# Patient Record
Sex: FEMALE | Race: WHITE | Employment: OTHER | URBAN - METROPOLITAN AREA
[De-identification: names, ages, dates, MRNs, and addresses within clinical notes are randomized per-mention and may not be internally consistent; named-entity substitution may affect disease eponyms.]

---

## 2017-05-08 ENCOUNTER — HOSPITAL ENCOUNTER (OUTPATIENT)
Dept: MAMMOGRAPHY | Age: 67
Discharge: HOME | End: 2017-05-08
Attending: INTERNAL MEDICINE | Admitting: INTERNAL MEDICINE

## 2017-05-08 DIAGNOSIS — Z12.31 ENCOUNTER FOR SCREENING MAMMOGRAM FOR MALIGNANT NEOPLASM OF BREAST: ICD-10-CM

## 2018-05-24 ENCOUNTER — OFFICE VISIT (OUTPATIENT)
Dept: ORTHOPEDIC SURGERY | Age: 68
End: 2018-05-24

## 2018-05-24 VITALS
BODY MASS INDEX: 26.66 KG/M2 | SYSTOLIC BLOOD PRESSURE: 139 MMHG | DIASTOLIC BLOOD PRESSURE: 87 MMHG | HEART RATE: 72 BPM | HEIGHT: 69 IN | WEIGHT: 180 LBS

## 2018-05-24 DIAGNOSIS — M25.561 RIGHT KNEE PAIN, UNSPECIFIED CHRONICITY: Primary | ICD-10-CM

## 2018-05-24 DIAGNOSIS — M17.10 PATELLOFEMORAL ARTHRITIS: ICD-10-CM

## 2018-05-24 PROCEDURE — 3017F COLORECTAL CA SCREEN DOC REV: CPT | Performed by: PHYSICIAN ASSISTANT

## 2018-05-24 PROCEDURE — G8419 CALC BMI OUT NRM PARAM NOF/U: HCPCS | Performed by: PHYSICIAN ASSISTANT

## 2018-05-24 PROCEDURE — 4040F PNEUMOC VAC/ADMIN/RCVD: CPT | Performed by: PHYSICIAN ASSISTANT

## 2018-05-24 PROCEDURE — 99213 OFFICE O/P EST LOW 20 MIN: CPT | Performed by: PHYSICIAN ASSISTANT

## 2018-05-24 PROCEDURE — G8427 DOCREV CUR MEDS BY ELIG CLIN: HCPCS | Performed by: PHYSICIAN ASSISTANT

## 2018-05-24 PROCEDURE — 1090F PRES/ABSN URINE INCON ASSESS: CPT | Performed by: PHYSICIAN ASSISTANT

## 2018-05-24 PROCEDURE — 1036F TOBACCO NON-USER: CPT | Performed by: PHYSICIAN ASSISTANT

## 2018-05-24 PROCEDURE — 1123F ACP DISCUSS/DSCN MKR DOCD: CPT | Performed by: PHYSICIAN ASSISTANT

## 2018-05-24 PROCEDURE — G8400 PT W/DXA NO RESULTS DOC: HCPCS | Performed by: PHYSICIAN ASSISTANT

## 2018-05-24 RX ORDER — POTASSIUM CHLORIDE 20 MEQ/1
TABLET, EXTENDED RELEASE ORAL
COMMUNITY
Start: 2018-04-30

## 2018-05-24 RX ORDER — ATENOLOL AND CHLORTHALIDONE TABLET 50; 25 MG/1; MG/1
TABLET ORAL
COMMUNITY
Start: 2018-04-12

## 2018-05-24 RX ORDER — BUPROPION HYDROCHLORIDE 300 MG/1
TABLET ORAL
COMMUNITY
Start: 2018-05-01

## 2018-06-18 ENCOUNTER — OFFICE VISIT (OUTPATIENT)
Dept: ORTHOPEDIC SURGERY | Age: 68
End: 2018-06-18

## 2018-06-18 VITALS — HEIGHT: 69 IN | BODY MASS INDEX: 26.66 KG/M2 | WEIGHT: 180 LBS

## 2018-06-18 DIAGNOSIS — M17.11 PRIMARY OSTEOARTHRITIS OF RIGHT KNEE: Primary | ICD-10-CM

## 2018-06-18 PROCEDURE — 1123F ACP DISCUSS/DSCN MKR DOCD: CPT | Performed by: ORTHOPAEDIC SURGERY

## 2018-06-18 PROCEDURE — 1090F PRES/ABSN URINE INCON ASSESS: CPT | Performed by: ORTHOPAEDIC SURGERY

## 2018-06-18 PROCEDURE — G8400 PT W/DXA NO RESULTS DOC: HCPCS | Performed by: ORTHOPAEDIC SURGERY

## 2018-06-18 PROCEDURE — G8427 DOCREV CUR MEDS BY ELIG CLIN: HCPCS | Performed by: ORTHOPAEDIC SURGERY

## 2018-06-18 PROCEDURE — 3017F COLORECTAL CA SCREEN DOC REV: CPT | Performed by: ORTHOPAEDIC SURGERY

## 2018-06-18 PROCEDURE — 4040F PNEUMOC VAC/ADMIN/RCVD: CPT | Performed by: ORTHOPAEDIC SURGERY

## 2018-06-18 PROCEDURE — G8419 CALC BMI OUT NRM PARAM NOF/U: HCPCS | Performed by: ORTHOPAEDIC SURGERY

## 2018-06-18 PROCEDURE — 1036F TOBACCO NON-USER: CPT | Performed by: ORTHOPAEDIC SURGERY

## 2018-06-18 PROCEDURE — 99213 OFFICE O/P EST LOW 20 MIN: CPT | Performed by: ORTHOPAEDIC SURGERY

## 2018-07-16 ENCOUNTER — HOSPITAL ENCOUNTER (OUTPATIENT)
Dept: PHYSICAL THERAPY | Age: 68
Setting detail: THERAPIES SERIES
Discharge: HOME | End: 2018-07-16
Payer: MEDICARE

## 2018-07-16 PROCEDURE — 97112 NEUROMUSCULAR REEDUCATION: CPT

## 2018-07-16 PROCEDURE — G8979 MOBILITY GOAL STATUS: HCPCS

## 2018-07-16 PROCEDURE — G8978 MOBILITY CURRENT STATUS: HCPCS

## 2018-07-16 PROCEDURE — 97162 PT EVAL MOD COMPLEX 30 MIN: CPT

## 2018-07-16 ASSESSMENT — PAIN DESCRIPTION - LOCATION: LOCATION: ANKLE

## 2018-07-16 ASSESSMENT — PAIN DESCRIPTION - PAIN TYPE: TYPE: ACUTE PAIN

## 2018-07-16 ASSESSMENT — PAIN SCALES - GENERAL: PAINLEVEL_OUTOF10: 0

## 2018-07-16 ASSESSMENT — PAIN DESCRIPTION - ORIENTATION: ORIENTATION: LOWER;RIGHT

## 2018-07-16 ASSESSMENT — PAIN DESCRIPTION - FREQUENCY: FREQUENCY: INTERMITTENT

## 2018-07-16 ASSESSMENT — PAIN DESCRIPTION - PROGRESSION: CLINICAL_PROGRESSION: RAPIDLY IMPROVING

## 2018-07-16 ASSESSMENT — PAIN DESCRIPTION - DESCRIPTORS: DESCRIPTORS: ACHING;SHARP;SORE

## 2018-07-16 NOTE — FLOWSHEET NOTE
Physical Therapy Daily Treatment Note    Date:  2018    Patient Name:  Swathi Schultz    :  1950  MRN: [id number]  Restrictions/Precautions:    Medical/Treatment Diagnosis Information:  · Diagnosis: R knee pain   Insurance/Certification information:  PT Insurance Information: Medicare and Beena Bernstein  Physician Information:  Referring Practitioner: Blake Solis MD  Plan of care signed (Y/N):  N  Visit# / total visits:       G-Code (if applicable):         PT G-Codes  Functional Assessment Tool Used: LEFS  Score: 44/80  Functional Limitation: Mobility: Walking and moving around  Mobility: Walking and Moving Around Current Status (): At least 40 percent but less than 60 percent impaired, limited or restricted  Mobility: Walking and Moving Around Goal Status ():  At least 20 percent but less than 40 percent impaired, limited or restricted    Medicare Cap (if applicable):  530 = total amount used, updated 2018    Time in:   8:30      Timed Treatment: 15 Total Treatment Time:  60  ________________________________________________________________________________________    Pain Level:    /10  SUBJECTIVE:  See initial eval    OBJECTIVE:     Exercise/Equipment Resistance/Repetitions Other comments          TA set with BP cuff   NV    bridge NV    Prone hip IR/ER NV    clamshell NV    Hip stacking NV    Arch lifts NV    Standing hip ER into wall NV                                                                                      Other Therapeutic Activities:  Education regarding POC including demonstration with models of anatomy and physiology in order to maximize benefits of treatment; total neuro re-ed 15 minutes    Manual Treatments:     Correction of pelvic inominate, if indicated    Modalities:      Test/Measurements:  See initial eval       ASSESSMENT:   See initial eval      Treatment/Activity Tolerance:   [x] Patient tolerated treatment well [] Patient limited by ellie  [] Patient

## 2018-07-16 NOTE — PROGRESS NOTES
Physical Therapy  Initial Assessment  Date: 2018  Patient Name: Amalia Robertson  MRN: [id number]  : 1950          Restrictions   none per patient    Subjective   General  Chart Reviewed: Yes  Family / Caregiver Present: No  Referring Practitioner: Giovani Orr MD  Referral Date : 18  Diagnosis: R knee pain   Follows Commands: Within Functional Limits  PT Visit Information  Onset Date: 18  PT Insurance Information: Medicare and [employer]  Subjective  Subjective: Pt reports that she had a badly sprained ankle many years ago on her R LE which has caused her to have multiple falls over the past several years. She has worn orthotics for years, but when she tried to get new, more supportive ones, they bothered her knees too much that she sent them back. Has noticed that her R foot caves in more than L. In early April, pt fell landing directly on her R knee. Was in high amounts of pain directly over R knee, which also started affecting both hips and both shoulders. Difficulty turning over in bed, walking, going up and down stairs. Had difficulty at work. Saw MD and received a cortisone injection 18 with excellent results. Sharp knee pain resolved within a couple days as well as hip and shoulder pain. Continues to have pain along R LE, has scoliosis of lumbar spine, ankle pain with walking, and back/hip pain with walking. Able to walk 2 miles in the morning, but it causes her to have pain the rest of the day. Pt concerned that she will fall again and it will cause this to start back up. Pain Screening  Patient Currently in Pain: Yes  Pain Assessment  Pain Assessment: 0-10  Pain Level: 0 (back pain 2/10 lingers constantly )  Pain Type: Acute pain  Pain Location: Ankle  Pain Orientation: Lower;Right  Pain Descriptors: Aching; [place]; Sore  Pain Frequency: Intermittent  Clinical Progression: Rapidly improving  Vital Signs  Patient Currently in Pain: Yes    Social/Functional History  Social/Functional patient has been evaluated for physical therapy services and for therapy to continue, Medicare requires monthly physician review of the treatment plan. Please review the attached evaluation and/or summary of the patient's plan of care, and verify that you agree therapy should continue by signing the attached document and sending it back to our office. Plan of Care/Treatment to date:  [x] Therapeutic Exercise   [x] Modalities:  [x] Therapeutic Activity      [] Ultrasound  [] Electrical Stimulation   [x] Gait Training      [] Cervical Traction [] Lumbar Traction  [x] Neuromuscular Re-education  [] Hot/Coldpack [] Iontophoresis    [x] Instruction in HEP      Other:  [x] Manual Therapy       []                        [] Aquatic Therapy       []                      ? Frequency/Duration:  # Days per week: [] 1 day # Weeks: [] 1 week [] 5 weeks      [x] 2 days? [] 2 weeks [] 6 weeks     [] 3 days   [] 3 weeks [] 7 weeks     [] 4 days   [x] 4 weeks [] 8 weeks    Rehab Potential: [] Excellent [x] Good [] Fair  [] Poor       Electronically signed by:  Eunice Gonzalez, PT , DPT [id number]      If you have any questions or concerns, please don't hesitate to call.   Thank you for your referral.    Physician Signature:________________________________Date:__________________  By signing above, therapists plan is approved by physician

## 2018-07-27 ENCOUNTER — HOSPITAL ENCOUNTER (OUTPATIENT)
Dept: PHYSICAL THERAPY | Age: 68
Setting detail: THERAPIES SERIES
Discharge: HOME | End: 2018-07-27
Payer: MEDICARE

## 2018-07-27 PROCEDURE — 97140 MANUAL THERAPY 1/> REGIONS: CPT

## 2018-07-27 PROCEDURE — 97110 THERAPEUTIC EXERCISES: CPT

## 2018-07-30 ENCOUNTER — HOSPITAL ENCOUNTER (OUTPATIENT)
Dept: PHYSICAL THERAPY | Age: 68
Setting detail: THERAPIES SERIES
Discharge: HOME | End: 2018-07-30
Payer: MEDICARE

## 2018-07-30 PROCEDURE — 97110 THERAPEUTIC EXERCISES: CPT

## 2018-07-30 PROCEDURE — 97140 MANUAL THERAPY 1/> REGIONS: CPT

## 2018-07-30 NOTE — FLOWSHEET NOTE
break R QL/lumbar paraspinals  Scissors MET followed by shotgun technique without cavitation    Total manual 10 minutes     Modalities:      Test/Measurements:  See initial eval       ASSESSMENT:   Pt demonstrated mod difficulty with maintaining 50 mmHg during LE movement (max previously). Responding well to manual therapy. Progress as pt tolerates. Treatment/Activity Tolerance:   [x] Patient tolerated treatment well [] Patient limited by fatique  [] Patient limited by pain [] Patient limited by other medical complications  [] Other:     Goals:          Long term goals  Time Frame for Long term goals : 4 weeks  Long term goal 1: Pt will be independent with HEP  Long term goal 2: Pt will increase R LE strength to grossly 4/5 or better  Long term goal 3: Pt will report walking in the morning without limitation of pain  Long term goal 4: Pt will demonstrate improved single leg balance R LE to 10 seconds or greater with neutral foot, knee, hip  Long term goal 5: Pt will demonstrate proper propulsion and hip extension while ambulating within clinic     Plan: [x] Continue per plan of care [] Alter current plan (see comments)   [] Plan of care initiated [] Hold pending MD visit [] Discharge      Plan for Next Session:  Correction of biomechanical dysfunctions as they present on visit. Restore proper motor firing pattern and functional muscle activation as presented on visit. Progress as tolerates.     Re-Certification Due Date:         Signature:  Duncan Rodriguez, PT

## 2018-08-03 ENCOUNTER — HOSPITAL ENCOUNTER (OUTPATIENT)
Dept: PHYSICAL THERAPY | Age: 68
Setting detail: THERAPIES SERIES
Discharge: HOME | End: 2018-08-03
Payer: MEDICARE

## 2018-08-03 PROCEDURE — 97110 THERAPEUTIC EXERCISES: CPT

## 2018-08-03 PROCEDURE — 97140 MANUAL THERAPY 1/> REGIONS: CPT

## 2018-08-03 NOTE — FLOWSHEET NOTE
awais correction  Side-lying gr IV mobilization to L4-5 opening R  Breaking break R QL/lumbar paraspinals  Scissors MET followed by shotgun technique without cavitation    Total manual 10 minutes     Modalities:      Test/Measurements:  See initial eval       ASSESSMENT:   Pt demonstrated min difficulty with maintaining 50 mmHg during LE movement (mod previously). Responding well to manual therapy. Pt had good posture during new hip stacking activity, limited by her R ankle stabilization. Verbal cues required to maintain neutral hip positioning. Progress as pt tolerates. Treatment/Activity Tolerance:   [x] Patient tolerated treatment well [] Patient limited by fatique  [] Patient limited by pain [] Patient limited by other medical complications  [] Other:     Goals:          Long term goals  Time Frame for Long term goals : 4 weeks  Long term goal 1: Pt will be independent with HEP  Long term goal 2: Pt will increase R LE strength to grossly 4/5 or better  Long term goal 3: Pt will report walking in the morning without limitation of pain  Long term goal 4: Pt will demonstrate improved single leg balance R LE to 10 seconds or greater with neutral foot, knee, hip  Long term goal 5: Pt will demonstrate proper propulsion and hip extension while ambulating within clinic     Plan: [x] Continue per plan of care [] Alter current plan (see comments)   [] Plan of care initiated [] Hold pending MD visit [] Discharge      Plan for Next Session:  Correction of biomechanical dysfunctions as they present on visit. Restore proper motor firing pattern and functional muscle activation as presented on visit. Progress as tolerates.     Re-Certification Due Date:         Signature:  Primo Gonzalez, PT

## 2018-08-13 ENCOUNTER — APPOINTMENT (OUTPATIENT)
Dept: PHYSICAL THERAPY | Age: 68
End: 2018-08-13
Payer: MEDICARE

## 2018-08-17 ENCOUNTER — APPOINTMENT (OUTPATIENT)
Dept: PHYSICAL THERAPY | Age: 68
End: 2018-08-17
Payer: MEDICARE

## 2018-08-20 ENCOUNTER — HOSPITAL ENCOUNTER (OUTPATIENT)
Dept: PHYSICAL THERAPY | Age: 68
Setting detail: THERAPIES SERIES
Discharge: HOME | End: 2018-08-20
Payer: MEDICARE

## 2018-08-20 PROCEDURE — 97140 MANUAL THERAPY 1/> REGIONS: CPT

## 2018-08-20 PROCEDURE — 97110 THERAPEUTIC EXERCISES: CPT

## 2018-08-24 ENCOUNTER — HOSPITAL ENCOUNTER (OUTPATIENT)
Dept: PHYSICAL THERAPY | Age: 68
Setting detail: THERAPIES SERIES
Discharge: HOME | End: 2018-08-24
Payer: MEDICARE

## 2018-08-24 PROCEDURE — 97110 THERAPEUTIC EXERCISES: CPT

## 2018-08-24 PROCEDURE — 97140 MANUAL THERAPY 1/> REGIONS: CPT

## 2018-08-24 NOTE — FLOWSHEET NOTE
Physical Therapy Daily Treatment Note    Date:  2018    Patient Name:  Nahomi Morales    :  1950  MRN: [id number]  Restrictions/Precautions:    Medical/Treatment Diagnosis Information:  · Diagnosis: R knee pain   Insurance/Certification information:  PT Insurance Information: Medicare and Keri0 Sandip Tang  Physician Information:  Referring Practitioner: Juan Rosales MD  Plan of care signed (Y/N):  N  Visit# / total visits:        G-Code (if applicable):         PT G-Codes  Functional Assessment Tool Used: LEFS  Score: 44/80  Functional Limitation: Mobility: Walking and moving around  Mobility: Walking and Moving Around Current Status (): At least 40 percent but less than 60 percent impaired, limited or restricted  Mobility: Walking and Moving Around Goal Status (): At least 20 percent but less than 40 percent impaired, limited or restricted    Medicare Cap (if applicable):  608 = total amount used, updated 2018    Time in:   3:05      Timed Treatment: 30 Total Treatment Time:  30  ________________________________________________________________________________________    Pain Level:    4-5/10 R lower back  SUBJECTIVE:  Pt reports that her back pain is not as frequent, but when it comes, the intensity is the same. She is able to help herself a lot with remembering her abdominals while standing at work, but she doesn't have the strength to keep them on.       OBJECTIVE:     Exercise/Equipment Resistance/Repetitions Other comments          TA set with BP cuff     -  Until control demonstrated   HEP   bridge 10x HEP   LTR 10x B HEP   Prone hip IR/ER with resistance x2 minutes    clamshell 15x5\" B HEP   Hip stacking 2x30 B         Standing hip ER into wall NV                                                 TG                                    Other Therapeutic Activities:  Education regarding POC including demonstration with models of anatomy and physiology in order to maximize

## 2018-08-27 ENCOUNTER — APPOINTMENT (OUTPATIENT)
Dept: PHYSICAL THERAPY | Age: 68
End: 2018-08-27
Payer: MEDICARE

## 2018-08-27 ENCOUNTER — HOSPITAL ENCOUNTER (OUTPATIENT)
Dept: PHYSICAL THERAPY | Age: 68
Setting detail: THERAPIES SERIES
Discharge: HOME | End: 2018-08-27
Payer: MEDICARE

## 2018-08-27 PROCEDURE — G8981 BODY POS CURRENT STATUS: HCPCS

## 2018-08-27 PROCEDURE — G8979 MOBILITY GOAL STATUS: HCPCS

## 2018-08-27 PROCEDURE — 97161 PT EVAL LOW COMPLEX 20 MIN: CPT

## 2018-08-27 PROCEDURE — 95992 CANALITH REPOSITIONING PROC: CPT

## 2018-08-27 PROCEDURE — 97112 NEUROMUSCULAR REEDUCATION: CPT

## 2018-08-27 NOTE — PROGRESS NOTES
Outpatient Physical Therapy  Phone: [phone] Fax: [phone]     To: Akanksha Elizondo MD    From: Jose Waters PT     Patient: Anat Alexander      : 1950  Diagnosis:  Vertigo, neck pain   Date: 2018  Treatment Diagnosis:      Physical Therapy Certification/Re-Certification Form  Dear Dr. Yordan Okeefe,   The following patient has been evaluated for physical therapy services and for therapy to continue, Medicare requires monthly physician review of the treatment plan. Please review the attached evaluation and/or summary of the patient's plan of care, and verify that you agree therapy should continue by signing the attached document and sending it back to our office. Plan of Care/Treatment to date:  [] Therapeutic Exercise   [] Modalities:  [] Therapeutic Activity     [] Ultrasound  [] Electrical Stimulation   [] Gait Training      [] Cervical Traction [] Lumbar Traction  [x] Neuromuscular Re-education  [] Hot/Coldpack [] Iontophoresis    [x] Instruction in HEP      Other:  [x] Manual Therapy       [x]   Vestibular rehab                     [] Aquatic Therapy       []                      ? Frequency/Duration:  # Days per week: [x] 1 day # Weeks: [] 1 week [] 5 weeks      [x] 2 days? [] 2 weeks [] 6 weeks     [] 3 days   [] 3 weeks [] 7 weeks     [] 4 days   [x] 4 weeks [] 8 weeks    Rehab Potential: [] Excellent [x] Good [] Fair  [] Poor       Electronically signed by:  Jose Waters PT , DPT [id number]      If you have any questions or concerns, please don't hesitate to call.   Thank you for your referral.    Physician Signature:________________________________Date:__________________  By signing above, therapists plan is approved by physician
Assessment Tool Used: DHI  Score: 26  Functional Limitation: Changing and maintaining body position  Mobility: Walking and Moving Around Goal Status (): At least 20 percent but less than 40 percent impaired, limited or restricted  Changing and Maintaining Body Position Current Status (): At least 1 percent but less than 20 percent impaired, limited or restricted    Treatment Plan:    Today's Treatment:    [x] See flowsheet   [x] Patient treated with canalith repositioning maneuver   [x] Education materials provided on BPPV/Vestibular Dysfunction   [x] Precautions provided and patient to follow precautions for next 24 hours in regards to BPPV management   [] Written home exercise instructions   [] Other:    Plan: 1-2 x/week for 4 weeks   [x] Home Exercise Program  [x] Canalith Repositioning Maneuvers   [] Gaze Stabilization Exercises [] Habituation Exercises   [x] Neuromuscular Re-Education [x] Clinic-based Vestibular/Balance Therapy   [x] Patient Education   [] Other:   [] Patient agrees with Plan of Care    Signature:  Sierra Valverde, PT, DPT [id number]

## 2018-08-31 ENCOUNTER — HOSPITAL ENCOUNTER (OUTPATIENT)
Dept: PHYSICAL THERAPY | Age: 68
Setting detail: THERAPIES SERIES
Discharge: HOME | End: 2018-08-31
Payer: MEDICARE

## 2018-08-31 PROCEDURE — 97110 THERAPEUTIC EXERCISES: CPT

## 2018-08-31 PROCEDURE — 97140 MANUAL THERAPY 1/> REGIONS: CPT

## 2018-08-31 NOTE — FLOWSHEET NOTE
Physical Therapy Daily Treatment Note    Date:  2018    Patient Name:  Norma Mera    :  1950  MRN: [id number]  Restrictions/Precautions:    Medical/Treatment Diagnosis Information:  · Diagnosis: vertigo, neck pain  Insurance/Certification information:  PT Insurance Information: Medicare and Keri0 Sandip Tang  Physician Information:  Referring Practitioner: Miles Saul MD  Plan of care signed (Y/N):  N  Visit# / total visits:  -     G-Code (if applicable):         PT G-Codes  Functional Assessment Tool Used: DHI  Score: 0 (26 at eval)  Functional Limitation: Changing and maintaining body position  Mobility: Walking and Moving Around Goal Status (): At least 20 percent but less than 40 percent impaired, limited or restricted  Changing and Maintaining Body Position Current Status (): At least 1 percent but less than 20 percent impaired, limited or restricted    Medicare Cap (if applicable):  181 = total amount used, updated 2018    Time in:   3:00      Timed Treatment: 5 Total Treatment Time:  5  ________________________________________________________________________________________    Pain Level:    /10  SUBJECTIVE:  Pt reports that her dizziness is gone.      OBJECTIVE:     Exercise/Equipment Resistance/Repetitions Other comments          Canalith repositioning                                                                                                                                 Other Therapeutic Activities:     Manual Treatments:         Modalities:      Test/Measurements:       Positional Testing  R Hallpike-Charanjit maneuver:               Nystagmus:     [] Yes                 [x] No                   [] Duration:                                              [] Direction:                      Vertigo:            [] Yes                 [x] No                   [] Duration:   L Hallpike-Charanjit maneuver:              Nystagmus:     [] Yes                 [x] No [] Duration:                                          [] Direction:                      Vertigo:            [] Yes                 [x] No                   [] Duration:   Supine roll head right:              Nystagmus:     [] Yes                 [x] No                   [] Duration:                                              [] Direction:                      Vertigo:            [] Yes                 [x] No                   [] Duration:   Supine roll head left:              Nystagmus:     [] Yes                 [x] No                   [] Duration:                                              [] Direction:                      Vertigo:            [] Yes                 [x] No                   [] Duration:         ASSESSMENT:     See initial eval    Treatment/Activity Tolerance:   [x] Patient tolerated treatment well [] Patient limited by fatique  [] Patient limited by pain [] Patient limited by other medical complications  [] Other:     Goals:          Long term goals  Time Frame for Long term goals : 4 weeks  Long term goal 1: Pt will be independent with HEP 0- met  Long term goal 2: Pt will transfer sit<>supine and rolling without dizziness - met  Long term goal 3: Pt will look up/down without dizziness - met  Long term goal 4: Pt will improve score on DHI to 16 or lower to indicate significant change - met    Plan: [] Continue per plan of care [] Alter current plan (see comments)   [] Plan of care initiated [] Hold pending MD visit [x] Discharge      Plan for Next Session:      Re-Certification Due Date:         Signature:  Esteban Carson PT

## 2018-09-03 ENCOUNTER — APPOINTMENT (OUTPATIENT)
Dept: PHYSICAL THERAPY | Age: 68
End: 2018-09-03
Payer: MEDICARE

## 2018-09-07 ENCOUNTER — HOSPITAL ENCOUNTER (OUTPATIENT)
Dept: PHYSICAL THERAPY | Age: 68
Setting detail: THERAPIES SERIES
Discharge: HOME | End: 2018-09-07
Payer: MEDICARE

## 2018-09-07 PROCEDURE — 97164 PT RE-EVAL EST PLAN CARE: CPT

## 2018-09-07 PROCEDURE — 97112 NEUROMUSCULAR REEDUCATION: CPT

## 2018-09-07 PROCEDURE — G8978 MOBILITY CURRENT STATUS: HCPCS

## 2018-09-07 PROCEDURE — G8979 MOBILITY GOAL STATUS: HCPCS

## 2018-09-07 NOTE — FLOWSHEET NOTE
order to maximize benefits of treatment     Manual Treatments:         Modalities:      Test/Measurements:      Observation/Palpation  Posture: Fair  Palpation: no significant TTP   Observation: Pt stands with increased pes planus R compared to L, B genuvarus (mild), but increased hip IR/ADD bringing knee inward during stance; During ambulation, improved propulsion B (At eval: pt demonstances absent arch on R, poor propulsion R with pelvic rotation to avoid full hip extension on R LE)  Body Mechanics:  Double leg squat - improved (At eval: double leg squat favors R LE)  SLS R LE maintains neutral at hip, knee, ankle, improved balance  (At eval: demonstrated R pes planus, lateral hip lean, knee valgus moment and increased imbalance)  SLS L LE maintains neutral at hip, knee, ankle, improved balance      PROM RLE (degrees)  RLE PROM: WNL  PROM LLE (degrees)  LLE PROM: WNL  Spine  Lumbar: WBL without pain   Special Tests: (-) flick test B;      Strength RLE  Strength RLE: Exception  R Hip Flexion: 4/5 (4-/5 at eval)  R Hip Extension: 4/5 (4-/5 at eval)  R Hip ABduction: 4/5  R Hip Internal Rotation: 4/5 (4-/5 at eval)  R Hip External Rotation: 4/5 (4-/5 at eval)  R Knee Flexion: 4/5  R Knee Extension: 4+/5  R Ankle Dorsiflexion: 4+/5 (4-/5 at eval)  R Ankle Plantar flexion: 4/5  R Ankle Inversion: 4+/5 (4-/5 at eval)  R Ankle Eversion: 4+/5 (4-/5 at eval)    Strength LLE  Strength LLE: Exception  L Hip Flexion: 4/5  L Hip Extension: 4/5  L Hip ABduction: 4/5  L Hip Internal Rotation: 4/5  L Hip External Rotation: 4/5  L Knee Flexion: 4+/5  L Knee Extension: 4+/5  L Ankle Dorsiflexion: 5/5  L Ankle Plantar Flexion: 5/5  L Ankle Inversion: 5/5  L Ankle Eversion: 5/5    Balance  Sitting - Static: Good  Sitting - Dynamic: Good  Standing - Static: Fair;+  Standing - Dynamic: Fair;-  Single Leg Stance R Le (0 at eval)   Single Leg Stance L Le+ (10 at eval)         ASSESSMENT:   After 8 PT visits, pt is demonstrating significant improvements in LE strength, balance, and overall mechanics of R LE. She continues to have difficulty with higher level balance as well as functional movements with proper mechanics. Recommend continued PT at 1x/week for 4 weeks to work toward new goal.     Treatment/Activity Tolerance:   [x] Patient tolerated treatment well [] Patient limited by fatique  [] Patient limited by pain [] Patient limited by other medical complications  [] Other:     Goals:          Long term goals  Time Frame for Long term goals : 4 weeks  Long term goal 1: Pt will be independent with HEP - met  Long term goal 2: Pt will increase R LE strength to grossly 4/5 or better - met, see above  Long term goal 3: Pt will report walking in the morning without limitation of pain - met  Long term goal 4: Pt will demonstrate improved single leg balance R LE to 10 seconds or greater with neutral foot, knee, hip - met, see above   Long term goal 5: Pt will demonstrate proper propulsion and hip extension while ambulating within clinic - met    New goals  Long term goal 1: Pt will demonstrate proper ankle reaction to balance perturbations   Long term goal 2: Pt will demonstrate 30 seconds eyes closed on compliant surface without LOB  Long term goal 3: Pt will demonstrate proper bending/lifting mechanics     Plan: [] Continue per plan of care [x] Alter current plan (see comments)   [] Plan of care initiated [] Hold pending MD visit [] Discharge      Plan for Next Session:  Correction of biomechanical dysfunctions as they present on visit. Restore proper motor firing pattern and functional muscle activation as presented on visit. Progress as tolerates.     Re-Certification Due Date:         Signature:  Tiara Holloway PT          Outpatient Physical Therapy  Phone: [phone] Fax: [phone]     To: Liyah Arias MD     From: Tiara Holloway PT     Patient: Priyank Shay       : 1950  Diagnosis:  Knee pain     Date:

## 2018-09-10 ENCOUNTER — APPOINTMENT (OUTPATIENT)
Dept: PHYSICAL THERAPY | Age: 68
End: 2018-09-10
Payer: MEDICARE

## 2018-09-14 ENCOUNTER — HOSPITAL ENCOUNTER (OUTPATIENT)
Dept: PHYSICAL THERAPY | Age: 68
Setting detail: THERAPIES SERIES
Discharge: HOME | End: 2018-09-14
Payer: MEDICARE

## 2018-09-14 PROCEDURE — 97110 THERAPEUTIC EXERCISES: CPT

## 2018-09-14 NOTE — FLOWSHEET NOTE
Physical Therapy Daily Treatment Note    Date:  2018    Patient Name:  Tiesha Alvarez    :  1950  MRN: [id number]  Restrictions/Precautions:    Medical/Treatment Diagnosis Information:  · Diagnosis: R knee pain   Insurance/Certification information:  PT Insurance Information: Medicare and [employer] Sandip Tang  Physician Information:  Referring Practitioner: Lenora Milian MD  Plan of care signed (Y/N):  N  Visit# / total visits:        G-Code (if applicable):         PT G-Codes  Functional Assessment Tool Used: LEFS  Score: 52/80 (44/80 at eval)  Functional Limitation: Mobility: Walking and moving around  Mobility: Walking and Moving Around Current Status (): At least 40 percent but less than 60 percent impaired, limited or restricted  Mobility: Walking and Moving Around Goal Status (): At least 20 percent but less than 40 percent impaired, limited or restricted    Medicare Cap (if applicable):  402 = total amount used, updated 2018    Time in:   3:00      Timed Treatment: 30 Total Treatment Time:  30  ________________________________________________________________________________________    Pain Level:   0-1/10 R lower back  SUBJECTIVE:  Pt reports that she had a long day at work and had some back pain, but made it through the day well. Pain has resolved since being off work for a couple hours.      OBJECTIVE:     Exercise/Equipment Resistance/Repetitions Other comments          TA set with BP cuff     -    HEP   bridge HEP   LTR HEP   Prone hip IR/ER with resistance    clamshell HEP   Hip stacking with LE on SB 2x30 B         Standing hip ER into wall NV    rockerboard  1' balance, 1' rocking each drection    Romberg on airex 2x30\" EO/EC    Dowel angie neuro re-ed x5 minutes  Seated flexion  Sit<>stand    Tandem stance on airex X60\" B    Double limb stance on BOSU X60\"  With minisquat 10x           TG  Level 3 x 5 minutes                                  Other Therapeutic Activities:  Education regarding POC including demonstration with models of anatomy and physiology in order to maximize benefits of treatment     Manual Treatments:         Modalities:      Test/Measurements:      Observation/Palpation  Posture: Fair  Palpation: no significant TTP   Observation: Pt stands with increased pes planus R compared to L, B genuvarus (mild), but increased hip IR/ADD bringing knee inward during stance; During ambulation, improved propulsion B (At eval: pt demonstances absent arch on R, poor propulsion R with pelvic rotation to avoid full hip extension on R LE)  Body Mechanics:  Double leg squat - improved (At eval: double leg squat favors R LE)  SLS R LE maintains neutral at hip, knee, ankle, improved balance  (At eval: demonstrated R pes planus, lateral hip lean, knee valgus moment and increased imbalance)  SLS L LE maintains neutral at hip, knee, ankle, improved balance      PROM RLE (degrees)  RLE PROM: WNL  PROM LLE (degrees)  LLE PROM: WNL  Spine  Lumbar: WBL without pain   Special Tests: (-) flick test B;      Strength RLE  Strength RLE: Exception  R Hip Flexion: 4/5 (4-/5 at eval)  R Hip Extension: 4/5 (4-/5 at eval)  R Hip ABduction: 4/5  R Hip Internal Rotation: 4/5 (4-/5 at eval)  R Hip External Rotation: 4/5 (4-/5 at eval)  R Knee Flexion: 4/5  R Knee Extension: 4+/5  R Ankle Dorsiflexion: 4+/5 (4-/5 at eval)  R Ankle Plantar flexion: 4/5  R Ankle Inversion: 4+/5 (4-/5 at eval)  R Ankle Eversion: 4+/5 (4-/5 at eval)    Strength LLE  Strength LLE: Exception  L Hip Flexion: 4/5  L Hip Extension: 4/5  L Hip ABduction: 4/5  L Hip Internal Rotation: 4/5  L Hip External Rotation: 4/5  L Knee Flexion: 4+/5  L Knee Extension: 4+/5  L Ankle Dorsiflexion: 5/5  L Ankle Plantar Flexion: 5/5  L Ankle Inversion: 5/5  L Ankle Eversion: 5/5    Balance  Sitting - Static: Good  Sitting - Dynamic: Good  Standing - Static: Fair;+  Standing - Dynamic: Fair;-  Single Leg Stance R Le (0 at eval)

## 2018-09-17 ENCOUNTER — APPOINTMENT (OUTPATIENT)
Dept: PHYSICAL THERAPY | Age: 68
End: 2018-09-17
Payer: MEDICARE

## 2018-09-21 ENCOUNTER — HOSPITAL ENCOUNTER (OUTPATIENT)
Dept: PHYSICAL THERAPY | Age: 68
Setting detail: THERAPIES SERIES
Discharge: HOME | End: 2018-09-21
Payer: MEDICARE

## 2018-09-21 PROCEDURE — 97110 THERAPEUTIC EXERCISES: CPT

## 2018-09-21 NOTE — FLOWSHEET NOTE
Education regarding POC including demonstration with models of anatomy and physiology in order to maximize benefits of treatment     Manual Treatments:         Modalities:      Test/Measurements:      Observation/Palpation  Posture: Fair  Palpation: no significant TTP   Observation: Pt stands with increased pes planus R compared to L, B genuvarus (mild), but increased hip IR/ADD bringing knee inward during stance; During ambulation, improved propulsion B (At eval: pt demonstances absent arch on R, poor propulsion R with pelvic rotation to avoid full hip extension on R LE)  Body Mechanics:  Double leg squat - improved (At eval: double leg squat favors R LE)  SLS R LE maintains neutral at hip, knee, ankle, improved balance  (At eval: demonstrated R pes planus, lateral hip lean, knee valgus moment and increased imbalance)  SLS L LE maintains neutral at hip, knee, ankle, improved balance      PROM RLE (degrees)  RLE PROM: WNL  PROM LLE (degrees)  LLE PROM: WNL  Spine  Lumbar: WBL without pain   Special Tests: (-) flick test B;      Strength RLE  Strength RLE: Exception  R Hip Flexion: 4/5 (4-/5 at eval)  R Hip Extension: 4/5 (4-/5 at eval)  R Hip ABduction: 4/5  R Hip Internal Rotation: 4/5 (4-/5 at eval)  R Hip External Rotation: 4/5 (4-/5 at eval)  R Knee Flexion: 4/5  R Knee Extension: 4+/5  R Ankle Dorsiflexion: 4+/5 (4-/5 at eval)  R Ankle Plantar flexion: 4/5  R Ankle Inversion: 4+/5 (4-/5 at eval)  R Ankle Eversion: 4+/5 (4-/5 at eval)    Strength LLE  Strength LLE: Exception  L Hip Flexion: 4/5  L Hip Extension: 4/5  L Hip ABduction: 4/5  L Hip Internal Rotation: 4/5  L Hip External Rotation: 4/5  L Knee Flexion: 4+/5  L Knee Extension: 4+/5  L Ankle Dorsiflexion: 5/5  L Ankle Plantar Flexion: 5/5  L Ankle Inversion: 5/5  L Ankle Eversion: 5/5    Balance  Sitting - Static: Good  Sitting - Dynamic: Good  Standing - Static: Fair;+  Standing - Dynamic: Fair;-  Single Leg Stance R Le (0 at eval)   Single Leg Stance L Le+ (10 at eval)         ASSESSMENT:  Pt demonstrated proper ankle and hip strategies and improved balance with lumbar stabilization cues. Increased knee pain with minisquats on BOSU, but improved at end of session. Progress balance and control as pt tolerates. Treatment/Activity Tolerance:   [x] Patient tolerated treatment well [] Patient limited by fatique  [] Patient limited by pain [] Patient limited by other medical complications  [] Other:     Goals:          Long term goals  Time Frame for Long term goals : 4 weeks  Long term goal 1: Pt will be independent with HEP - met  Long term goal 2: Pt will increase R LE strength to grossly 4/5 or better - met, see above  Long term goal 3: Pt will report walking in the morning without limitation of pain - met  Long term goal 4: Pt will demonstrate improved single leg balance R LE to 10 seconds or greater with neutral foot, knee, hip - met, see above   Long term goal 5: Pt will demonstrate proper propulsion and hip extension while ambulating within clinic - met    New goals  Long term goal 1: Pt will demonstrate proper ankle reaction to balance perturbations   Long term goal 2: Pt will demonstrate 30 seconds eyes closed on compliant surface without LOB  Long term goal 3: Pt will demonstrate proper bending/lifting mechanics     Plan: [x] Continue per plan of care [] Alter current plan (see comments)   [] Plan of care initiated [] Hold pending MD visit [] Discharge      Plan for Next Session:  Correction of biomechanical dysfunctions as they present on visit. Restore proper motor firing pattern and functional muscle activation as presented on visit. Progress as tolerates.     Re-Certification Due Date:

## 2018-09-24 ENCOUNTER — APPOINTMENT (OUTPATIENT)
Dept: PHYSICAL THERAPY | Age: 68
End: 2018-09-24
Payer: MEDICARE

## 2018-09-28 ENCOUNTER — HOSPITAL ENCOUNTER (OUTPATIENT)
Dept: PHYSICAL THERAPY | Age: 68
Setting detail: THERAPIES SERIES
Discharge: HOME | End: 2018-09-28
Payer: MEDICARE

## 2018-09-28 NOTE — PROGRESS NOTES
Physical Therapy  Cancellation/No-show Note  Patient Name:  Chiqui Gonzalez  :  1950   Date:  2018  Cancels to date: 1  No-shows to date: 0    For today's appointment patient:  [x] Cancelled  [] Rescheduled appointment  [] No-show     Reason given by patient:  [] Patient ill  [] Conflicting appointment  [] No transportation    [x] Conflict with work  [] No reason given  [] Other:     Comments:      Electronically signed by:  Hortensia Lazaro PT

## 2018-10-01 ENCOUNTER — APPOINTMENT (OUTPATIENT)
Dept: PHYSICAL THERAPY | Age: 68
End: 2018-10-01
Payer: MEDICARE

## 2018-10-05 ENCOUNTER — HOSPITAL ENCOUNTER (OUTPATIENT)
Dept: PHYSICAL THERAPY | Age: 68
Setting detail: THERAPIES SERIES
Discharge: HOME | End: 2018-10-05
Payer: MEDICARE

## 2018-10-05 PROCEDURE — 97110 THERAPEUTIC EXERCISES: CPT

## 2018-10-05 NOTE — FLOWSHEET NOTE
Physical Therapy Daily Treatment Note    Date:  10/5/2018    Patient Name:  Emmanuel Sánchez    :  1950  MRN: [id number]  Restrictions/Precautions:    Medical/Treatment Diagnosis Information:  · Diagnosis: R knee pain   Insurance/Certification information:  PT Insurance Information: Medicare and [facility] Sandip Tang  Physician Information:  Referring Practitioner: Marcello Bolaños MD  Plan of care signed (Y/N):  N  Visit# / total visits:        G-Code (if applicable):         PT G-Codes  Functional Assessment Tool Used: LEFS  Score: 52/80 (44/80 at eval)  Functional Limitation: Mobility: Walking and moving around  Mobility: Walking and Moving Around Current Status (): At least 40 percent but less than 60 percent impaired, limited or restricted  Mobility: Walking and Moving Around Goal Status (): At least 20 percent but less than 40 percent impaired, limited or restricted    Medicare Cap (if applicable):  261 = total amount used, updated 10/5/2018    Time in:   3:00     Timed Treatment: 30 Total Treatment Time:  30  ________________________________________________________________________________________    Pain Level:   0-1/10 R lower back  SUBJECTIVE:  Pt reports that her sinuses have been acting up so everything feels a little worse. Knee pain acted up on Wednesday, but it is gone today. Back is sore off and on. Overall, feeling pretty good.      OBJECTIVE:     Exercise/Equipment Resistance/Repetitions Other comments          TA set with BP cuff     - march   - KF   HEP   bridge HEP   LTR HEP   Prone hip IR/ER with resistance    clamshell HEP   Hip stacking with LE on SB 2x30 B              rockerboard  1' balance, 1' rocking each drection    Romberg on airex 2x30\" EO/EC    Dowel angie neuro re-ed     Tandem stance on airex X60\" B    Double limb stance on BOSU X60\"  With minisquat 10x    B post sling 6\" step up 10x B    Lateral post sling with cross stabilization  10x B                TG  Level 3 x 5 minutes

## 2018-10-19 ENCOUNTER — HOSPITAL ENCOUNTER (OUTPATIENT)
Dept: PHYSICAL THERAPY | Age: 68
Setting detail: THERAPIES SERIES
Discharge: HOME | End: 2018-10-19
Payer: MEDICARE

## 2018-10-19 PROCEDURE — G8980 MOBILITY D/C STATUS: HCPCS

## 2018-10-19 PROCEDURE — G8979 MOBILITY GOAL STATUS: HCPCS

## 2018-10-19 PROCEDURE — 97110 THERAPEUTIC EXERCISES: CPT

## 2018-10-19 NOTE — PROGRESS NOTES
Outpatient Physical Therapy  Phone: [phone] Fax: [phone]     To: Cornel Stafford MD     From: Ludwin Pryor, PT     Patient: Ryann Muñiz       : 1950  Diagnosis: R knee pain     Date: 10/19/2018  Treatment Diagnosis:      Physical Therapy Progress/Discharge Note    Total Visits to date:   15 Cancels/No-shows to date:  0    Plan of Care/Treatment to date:  [x] Therapeutic Exercise    [] Modalities:  [x] Therapeutic Activity     [] Ultrasound   [] Electrical Stimulation   [x] Gait Training      [] Cervical Traction   [] Lumbar Traction  [x] Neuromuscular Re-education  [] Cold/hotpack  [] Iontophoresis  [x] Instruction in HEP      Other:  [x] Manual Therapy       []                                 [] Aquatic Therapy       []                               ? Significant Findings At Last Visit/Comments:     After 12 PT visits, pt is demonstrating significantly improved lumbopelvic control and stabilization strength. She also is able to connect the proprioceptive chain from ankle to spine to aid with single limb balance without substitution, leading to improved balance and body control. She has met all PT goals and is prepared for d/c this date. Given 30 day [employer] pass to continue exercise program independently.      Progress towards goals:    Please see attached progress note for details. Frequency/Duration:  # Days per week: [] 1 day # Weeks: [] 1 week [] 4 weeks      [x] 2 days?    [] 2 weeks [] 5 weeks     [] 3 days   [] 3 weeks [x] 6 weeks     Rehab Potential: [] Excellent [x] Good [] Fair  [] Poor     Goal Status:  [x] Achieved [] Partially Achieved  [] Not Achieved     Patient Status: [] Continue per initial plan of Care     [x] Patient now discharged     [] Additional visits requested, Please re-certify for additional visits:      Requested frequency/duration:  X/week for weeks    Electronically signed by:  Ludwin Pryor PT    If you have any questions or concerns, please

## 2020-06-24 NOTE — PROGRESS NOTES
Chief Complaint    No chief complaint on file. History of Present Illness:  Ede Jeronimo is a 71 y.o. femalehere for evaluation chief complaint of bilateral foot pain. She states that she had her left foot bunion corrected 13 years ago and did poorly. She has had a poor result and complains of pain underneath the metatarsal heads on the left side. Feels like she is walking on rocks. She also has a bunion on the right side but it is not as painful as the left. Most of the pain is over the left great toe MTP joint and over the lateral aspect of the foot. Being on her feet makes it worse as well as in tight shoes. She is better if she is in sandals and also has increased pain with bare feet. She has used orthotics from the podiatrist in the past but they hurt her because they are hard. Medical History:  Patient's medications, allergies, past medical, surgical, social and family histories were reviewed and updated as appropriate. Review of Systems:  Pertinent items are noted in HPI  Review of systems reviewed from Patient History Form dated on 6/25/2020 and available in the patient's chart under the Media tab. Vital Signs: There were no vitals taken for this visit. General Exam:   Constitutional: Patient is adequately groomed with no evidence of malnutrition  DTRs: Deep tendon reflexes are intact  Mental Status: The patient is oriented to time, place and person. The patient's mood and affect are appropriate. Lymphatic: The lymphatic examination bilaterally reveals all areas to be without enlargement or induration. Foot Examination:    Inspection: Bilateral bunion long second and third metatarsals    Palpation: Tenderness underneath the second third metatarsal heads left greater than right she also has pain at the left first MTP joint    Range of Motion: 20 to 30 degrees of left first MTP extension 90 degrees on the right    Strength:  Within normal limits    Special Tests: [facility]

## 2020-06-25 ENCOUNTER — OFFICE VISIT (OUTPATIENT)
Dept: ORTHOPEDIC SURGERY | Age: 70
End: 2020-06-25
Payer: MEDICARE

## 2020-06-25 VITALS — BODY MASS INDEX: 26.64 KG/M2 | HEIGHT: 69 IN | WEIGHT: 179.9 LBS

## 2020-06-25 PROCEDURE — 1090F PRES/ABSN URINE INCON ASSESS: CPT | Performed by: ORTHOPAEDIC SURGERY

## 2020-06-25 PROCEDURE — G8400 PT W/DXA NO RESULTS DOC: HCPCS | Performed by: ORTHOPAEDIC SURGERY

## 2020-06-25 PROCEDURE — 4040F PNEUMOC VAC/ADMIN/RCVD: CPT | Performed by: ORTHOPAEDIC SURGERY

## 2020-06-25 PROCEDURE — G8427 DOCREV CUR MEDS BY ELIG CLIN: HCPCS | Performed by: ORTHOPAEDIC SURGERY

## 2020-06-25 PROCEDURE — 1123F ACP DISCUSS/DSCN MKR DOCD: CPT | Performed by: ORTHOPAEDIC SURGERY

## 2020-06-25 PROCEDURE — 99213 OFFICE O/P EST LOW 20 MIN: CPT | Performed by: ORTHOPAEDIC SURGERY

## 2020-06-25 PROCEDURE — 3017F COLORECTAL CA SCREEN DOC REV: CPT | Performed by: ORTHOPAEDIC SURGERY

## 2020-06-25 PROCEDURE — 1036F TOBACCO NON-USER: CPT | Performed by: ORTHOPAEDIC SURGERY

## 2020-06-25 PROCEDURE — G8417 CALC BMI ABV UP PARAM F/U: HCPCS | Performed by: ORTHOPAEDIC SURGERY

## 2020-06-25 RX ORDER — MELOXICAM 15 MG/1
15 TABLET ORAL DAILY
Qty: 30 TABLET | Refills: 2 | Status: SHIPPED | OUTPATIENT
Start: 2020-06-25

## 2020-07-29 ENCOUNTER — NURSE ONLY (OUTPATIENT)
Dept: ORTHOPEDIC SURGERY | Age: 70
End: 2020-07-29
Payer: MEDICARE

## 2020-07-29 VITALS — BODY MASS INDEX: 25.77 KG/M2 | WEIGHT: 174 LBS | HEIGHT: 69 IN

## 2020-07-29 PROCEDURE — 99213 OFFICE O/P EST LOW 20 MIN: CPT | Performed by: PHYSICIAN ASSISTANT

## 2020-07-29 RX ORDER — BETAMETHASONE SODIUM PHOSPHATE AND BETAMETHASONE ACETATE 3; 3 MG/ML; MG/ML
24 INJECTION, SUSPENSION INTRA-ARTICULAR; INTRALESIONAL; INTRAMUSCULAR; SOFT TISSUE ONCE
Status: COMPLETED | OUTPATIENT
Start: 2020-07-29 | End: 2020-07-29

## 2020-07-29 RX ORDER — BUPIVACAINE HYDROCHLORIDE 5 MG/ML
60 INJECTION, SOLUTION PERINEURAL ONCE
Status: COMPLETED | OUTPATIENT
Start: 2020-07-29 | End: 2020-07-29

## 2020-07-29 RX ADMIN — BETAMETHASONE SODIUM PHOSPHATE AND BETAMETHASONE ACETATE 24 MG: 3; 3 INJECTION, SUSPENSION INTRA-ARTICULAR; INTRALESIONAL; INTRAMUSCULAR; SOFT TISSUE at 10:37

## 2020-07-29 RX ADMIN — BUPIVACAINE HYDROCHLORIDE 300 MG: 5 INJECTION, SOLUTION PERINEURAL at 10:37

## 2020-07-29 NOTE — PROGRESS NOTES
Chief Complaint    Knee Pain (rt knee pain and buckling ongoing for about 6 months just getting worse, lt knee is starting to hurt some)      History of Present Illness:  Orville Coley is a 71 y.o. female who comes in today for evaluation treatment of bilateral knee pain, right greater than left. She has been complained of increasing episodes of right anterior knee pain and buckling going on for the last 6 months. She has increased episodes of crepitation and instability in the right knee. On the left side she complains mainly of just pain. She does have difficulty going up and down stairs or going up and down hills as well as getting up and down out of a seated position. She takes occasional oral anti-inflammatories. We last saw her back in 2018 where she is received a right knee cortisone injection and did very well.       Pain Assessment  Location of Pain: Knee  Location Modifiers: Right, Left  Severity of Pain: 8  Quality of Pain: Buckling  Frequency of Pain: Intermittent  Aggravating Factors: Walking, Standing, Stairs  Limiting Behavior: Some  Result of Injury: No  Work-Related Injury: No  Are there other pain locations you wish to document?: No    Medical History:  Past Medical History:   Diagnosis Date    Benign tumor of spinal cord (HCC)     Fibromyalgia     Hepatitis     1970's    Hypertension     Leukemia (HCC)     (CLL)    Melanoma ([facility] [address]     Primary osteoarthritis of right knee 6/18/2018    Thyroid disease      Patient Active Problem List    Diagnosis Date Noted    Primary osteoarthritis of right knee 06/18/2018     Current Outpatient Medications   Medication Sig Dispense Refill    meloxicam (MOBIC) 15 MG tablet Take 1 tablet by mouth daily 30 tablet 2    diclofenac (VOLTAREN) 50 MG EC tablet Take 1 tablet by mouth 2 times daily 60 tablet 3    atenolol-chlorthalidone (TENORETIC) 50-25 MG per tablet       buPROPion (WELLBUTRIN XL) 300 MG extended release tablet TAKE ONE TABLET BY MOUTH EVERY MORNING      potassium chloride (KLOR-CON M) 20 MEQ extended release tablet       cyclobenzaprine (FLEXERIL) 10 MG tablet i po qHS PRN 30 tablet 0    pantoprazole (PROTONIX) 20 MG tablet Take 20 mg by mouth daily      fluticasone (VERAMYST) 27.5 MCG/SPRAY nasal spray 2 sprays by Nasal route daily      Lactobacillus (ACIDOPHILUS) 100 MG CAPS Take by mouth daily      BIOTIN 5000 PO Take by mouth Daily      Omega-3 Fatty Acids (FISH OIL) 1000 MG CAPS Take 1,000 mg by mouth 2 times daily      vitamin B-12 (CYANOCOBALAMIN) 1000 MCG tablet Take 2,500 mcg by mouth daily      NONFORMULARY Take 1,500 mg by mouth daily spirutina      docusate sodium (COLACE) 100 MG capsule Take 100 mg by mouth 2 times daily      CELEBREX 200 MG capsule TAKE ONE CAPSULE BY MOUTH DAILY 30 capsule 2    Irbesartan (AVAPRO PO) Take  by mouth.  TRIAMTERENE-HCTZ PO Take  by mouth.  Levothyroxine Sodium (SYNTHROID PO) Take  by mouth.  Estrogens Conjugated (PREMARIN PO) Take  by mouth.  Multiple Vitamins-Minerals (THERAPEUTIC MULTIVITAMIN-MINERALS) tablet Take 1 tablet by mouth daily.  Cholecalciferol (VITAMIN D PO) Take  by mouth. Current Facility-Administered Medications   Medication Dose Route Frequency Provider Last Rate Last Dose    betamethasone acetate-betamethasone sodium phosphate (CELESTONE) injection 24 mg  24 mg Intra-articular Once Shanna Mirza        bupivacaine (MARCAINE) 0.5 % injection 300 mg  60 mL Intra-articular Once STACIE Mirza           Review of Systems:  Relevant review of systems reviewed and available in the patient's chart    Vital Signs:  Ht 5' 9\" (1.753 m)   Wt 174 lb (78.9 kg)   BMI 25.70 kg/m²     General Exam:   Constitutional: Patient is adequately groomed with no evidence of malnutrition  DTRs: Deep tendon reflexes are intact  Mental Status: The patient is oriented to time, place and person. The patient's mood and affect are appropriate.   Lymphatic: The (MIN 4 VIEWS)     Standing Status:   Future     Number of Occurrences:   1     Standing Expiration Date:   7/24/2021    XR KNEE LEFT (MIN 4 VIEWS)     Standing Status:   Future     Number of Occurrences:   1     Standing Expiration Date:   7/29/2021    US ARTHR/ASP/INJ MAJOR JNT/BURSA RIGHT     Standing Status:   Future     Number of Occurrences:   1     Standing Expiration Date:   7/29/2021   Marvin Julio PT [facility] Physical Therapy     Referral Priority:   Routine     Referral Type:   Eval and Treat     Referral Reason:   Specialty Services Required     Requested Specialty:   Physical Therapy     Number of Visits Requested:   1    20610 - [place] DRAIN/INJECT LARGE JOINT/BURSA     Procedure: Left and right knee cortisone injection    I discussed in detail the risks, benefits and complications of aninjection which included but are not limited to infection, skin reactions, hot swollen joint, and anaphylaxis with the patient. The patient verbalized understanding and gave informed consent for the left and right knee injection. The patient's knee was slightly flexed with a bolster underneath the knee and the skin prepped using Betadine or sterile alcohol solution. A sterile 22-gauge needle was inserted into the knee and the mixture of 2ml Beta-Beta, 3 mL of 0.5% bupivacaine was injected under sterile technique. The needle was withdrawn and the puncture site sealed with a Band-Aid. Technique: Under sterile conditions a [employer] ultrasound unit with a variable frequency (6.0-15.0 MHz) linear transducer was used to localize the placement of a 22-gauge needle into the knee joint. Findings: Successful needle placement for knee injection. Final images were taken and saved for permanent record. The patient tolerated the injection well. The patient was instructed to call the office immediately if there is any pain, redness, warmth, fever, or chills.   Treatment Plan: I discussed the diagnosis and recommended treatment plan. She can continue with intermittent oral anti-inflammatories but we will also proceed with bilateral cortisone injections today and a course of outpatient physical therapy for quad strengthening and PPP protocol.   Follow-up in the office PRN

## 2020-08-13 ENCOUNTER — HOSPITAL ENCOUNTER (OUTPATIENT)
Dept: PHYSICAL THERAPY | Age: 70
Setting detail: THERAPIES SERIES
Discharge: HOME | End: 2020-08-13
Payer: MEDICARE

## 2020-08-13 PROCEDURE — 97110 THERAPEUTIC EXERCISES: CPT

## 2020-08-13 PROCEDURE — 97161 PT EVAL LOW COMPLEX 20 MIN: CPT

## 2020-08-13 ASSESSMENT — PAIN SCALES - GENERAL: PAINLEVEL_OUTOF10: 2

## 2020-08-13 ASSESSMENT — PAIN DESCRIPTION - PAIN TYPE: TYPE: CHRONIC PAIN

## 2020-08-13 ASSESSMENT — PAIN DESCRIPTION - FREQUENCY: FREQUENCY: INTERMITTENT

## 2020-08-13 ASSESSMENT — PAIN DESCRIPTION - DESCRIPTORS: DESCRIPTORS: ACHING;SHARP

## 2020-08-13 ASSESSMENT — PAIN DESCRIPTION - ORIENTATION: ORIENTATION: RIGHT;LEFT;LOWER

## 2020-08-13 ASSESSMENT — PAIN DESCRIPTION - LOCATION: LOCATION: KNEE;BACK

## 2020-08-13 NOTE — FLOWSHEET NOTE
Physical Therapy Daily Treatment Note    Date:  2020    Patient Name:  Janny Scherer    :  1950  MRN: [id number]  Restrictions/Precautions:    Medical/Treatment Diagnosis Information:  · Diagnosis: B knee pain, chondromalacia of patellae  Insurance/Certification information:  PT Insurance Information: Medicare and [facility] - no precert, medical necessity  Physician Information:  Referring Practitioner: STACIE Mckeon  Plan of care signed (Y/N):  N  Visit# / total visits:       G-Code (if applicable):              Medicare Cap (if applicable):  133 = total amount used, updated 2020    Time in:   8:00      Timed Treatment: 15 Total Treatment Time:  45  Time out: 8:45  ________________________________________________________________________________________    Pain Level:    /10  SUBJECTIVE:  See initial eval    OBJECTIVE:     Access Code: [email or web address]   URL: "[email or web address]   Date: 2020   Prepared by: Sundar Cottrell     Exercises  Supine Bridge - 10 reps - 1 sets - 2x daily - 7x weekly  Clamshell - 6 reps - 1 sets - 10 hold - 2x daily - 7x weekly  Straight Leg Raise - 10 reps - 1 sets - 2x daily - 7x weekly  Straight Leg Raise with External Rotation - 10 reps - 1 sets - 2x daily - 7x weekly  Hooklying Isometric Hip Flexion - 10 reps - 1 sets - 5 hold - 2x daily - 7x weekly    Exercise/Equipment Resistance/Repetitions Other comments                                                                                                                                             Other Therapeutic Activities:  Education regarding POC including demonstration with models of anatomy and physiology in order to maximize benefits of treatment     Manual Treatments:         Modalities:      Test/Measurements:  See initial eval       ASSESSMENT:   See initial eval      Treatment/Activity Tolerance:   [x]Patient tolerated treatment well [] Patient limited by fatique  []Patient limited by pain []

## 2020-08-14 NOTE — PROGRESS NOTES
(avoids hip movement on L) and quad dominant (reproduction of knee pain), SLS R demonstrated R pes planus, lateral hip lean, knee valgus moment and increased imbalance; SLS L LE maintains neutral at hip, knee, ankle, improved balance    PROM RLE (degrees)  RLE PROM: WNL  PROM LLE (degrees)  LLE PROM: WNL  Spine  Lumbar: WNL though with pain on L side during all movements  Special Tests: (-) flick test B; (+) R AI with R LE long in supine; L sacral tilt    Strength RLE  Comment: hip IR/ER 4-/5, hip ext 4/5  Strength LLE  Strength LLE: Exception  Comment: hip IR/ER 3+/5 with pain in lower back, hip ext 4-/5     Additional Measures  Special Tests: varus/valgus stress test (-), Deangelo (-), anterior drawer (-)  Other: DTR 2+ B achilles and patellar tendons, (-) clonus, (-) Babinski  Sensation  Overall Sensation Status: WNL(to light touch)         Assessment   Conditions Requiring Skilled Therapeutic Intervention  Body structures, Functions, Activity limitations: Decreased strength;Decreased high-level IADLs; Increased pain  Assessment: Pt is a 72 y/o female who presents with B knee pain due to aberrant motion patterns at pelvis/hip, valgus stress into B knees, and contributing to maltracking of the patella. Recommend skilled, outpatient PT to address deficits and to attain below-stated functional goals.   Prognosis: Good  Decision Making: Low Complexity  Activity Tolerance  Activity Tolerance: Patient Tolerated treatment well         Plan   Plan  Times per week: 2x/week for 4 weeks to include ther-ex, neuro re-ed/balance, postural training, gait training, therapeutic activity, manual therapy, pt education, and modalities PRN    Goals  Long term goals  Time Frame for Long term goals : 4 weeks  Long term goal 1: Pt will be independent with hEP  Long term goal 2: Pt will report 50% improvement in frequency and intensity of pain  Long term goal 3: Pt will demonstrate WFL B LE strength  Long term goal 4: Pt will Signature:________________________________Date:__________________  By signing above, therapists plan is approved by physician

## 2020-08-18 ENCOUNTER — APPOINTMENT (OUTPATIENT)
Dept: PHYSICAL THERAPY | Age: 70
End: 2020-08-18
Payer: MEDICARE

## 2020-08-20 ENCOUNTER — HOSPITAL ENCOUNTER (OUTPATIENT)
Dept: PHYSICAL THERAPY | Age: 70
Setting detail: THERAPIES SERIES
Discharge: HOME | End: 2020-08-20
Payer: MEDICARE

## 2020-08-20 PROCEDURE — 97110 THERAPEUTIC EXERCISES: CPT

## 2020-08-20 PROCEDURE — 97140 MANUAL THERAPY 1/> REGIONS: CPT

## 2020-08-20 NOTE — FLOWSHEET NOTE
Physical Therapy Daily Treatment Note    Date:  2020    Patient Name:  Irasema Davison    :  1950  MRN: [id number]  Restrictions/Precautions:    Medical/Treatment Diagnosis Information:  · Diagnosis: B knee pain, chondromalacia of patellae  Insurance/Certification information:  PT Insurance Information: Medicare and [facility] - no precert, medical necessity  Physician Information:  Referring Practitioner: STACIE Jean  Plan of care signed (Y/N):  N  Visit# / total visits:       G-Code (if applicable):              Medicare Cap (if applicable):  940 = total amount used, updated 2020    Time in:   7:15      Timed Treatment: 45 Total Treatment Time:  45  Time out: 8:00  ________________________________________________________________________________________    Pain Level:    0/10  SUBJECTIVE:  Pt reports [place] and could feel her R knee going up and down the hills. Today, no discomfort. OBJECTIVE:     Access Code: [email or web address]   URL: [email or web address]   Date: 2020   Prepared by: Durga Blake     Exercises  Supine Bridge - 10 reps - 1 sets - 2x daily - 7x weekly  Clamshell - 6 reps - 1 sets - 10 hold - 2x daily - 7x weekly  Straight Leg Raise - 10 reps - 1 sets - 2x daily - 7x weekly  Straight Leg Raise with External Rotation - 10 reps - 1 sets - 2x daily - 7x weekly  Hooklying Isometric Hip Flexion - 10 reps - 1 sets - 5 hold - 2x daily - 7x weekly    Exercise/Equipment Resistance/Repetitions Other comments   TG x5 minutes    Bridge 10x5\" HEP   SLR 10x B with pauses HEP   SLR with ER 10x B HEP   Clamshell  10x5\" R HEP          rockerboard 1' rocking, 1' balance each direction    SLS with LE on bal 2x30\" B    Monster walk NV    B shld ext with minisquat and tband around knees NV    multihip machine NV                                                              Other Therapeutic Activities:  Education regarding POC including demonstration with models of anatomy and physiology in order to maximize benefits of treatment     Manual Treatments:          Tibial rotation gr III mobs in hooklying  Varus/valgus mobilization with and without knee movement  EOP distraction    Total manual 10 minutes     Modalities:      Test/Measurements:  See initial eval       ASSESSMENT:   Pt reported \"feeling\" her R knee during standing activities. Cues required to keep knee in neutral (tendency for hip ADD/IR). Progress as pt tolerates. Treatment/Activity Tolerance:   [x]Patient tolerated treatment well [] Patient limited by fatique  []Patient limited by pain [] Patient limited by other medical complications  [] Other:     Goals:          Long term goals  Time Frame for Long term goals : 4 weeks  Long term goal 1: Pt will be independent with hEP  Long term goal 2: Pt will report 50% improvement in frequency and intensity of pain  Long term goal 3: Pt will demonstrate WFL B LE strength  Long term goal 4: Pt will ascend/descend stairs recirocally without limitation  Long term goal 5: Pt will return to exercise program without limitation     Plan: [x] Continue per plan of care [] Alter current plan (see comments)   [] Plan of care initiated [] Hold pending MD visit [] Discharge      Plan for Next Session:  Correction of biomechanical dysfunctions as they present on visit. Restore proper motor firing pattern and functional muscle activation as presented on visit. Progress as tolerates.     Re-Certification Due Date:         Signature:  Nunu Alexis , PT , DPT [id number]

## 2020-08-24 ENCOUNTER — HOSPITAL ENCOUNTER (OUTPATIENT)
Dept: PHYSICAL THERAPY | Age: 70
Setting detail: THERAPIES SERIES
Discharge: HOME | End: 2020-08-24
Payer: MEDICARE

## 2020-08-24 PROCEDURE — 97110 THERAPEUTIC EXERCISES: CPT

## 2020-08-24 PROCEDURE — 97140 MANUAL THERAPY 1/> REGIONS: CPT

## 2020-08-24 NOTE — FLOWSHEET NOTE
Physical Therapy Daily Treatment Note    Date:  2020    Patient Name:  José Wallis    :  1950  MRN: [id number]  Restrictions/Precautions:    Medical/Treatment Diagnosis Information:  · Diagnosis: B knee pain, chondromalacia of patellae  Insurance/Certification information:  PT Insurance Information: Medicare and [facility] - no precert, medical necessity  Physician Information:  Referring Practitioner: STACIE Mercado  Plan of care signed (Y/N):  N  Visit# / total visits:  3/8     G-Code (if applicable):              Medicare Cap (if applicable):  982 = total amount used, updated 2020    Time in:   8:30      Timed Treatment: 45 Total Treatment Time:  45  Time out: 9:15  ________________________________________________________________________________________    Pain Level:    0/10  SUBJECTIVE:  Pt reports that she can feel her knees on stairs/down hills. \"I don't think the shot worked this time. \"    OBJECTIVE:     Access Code: [email or web address]   URL: [email or web address]   Date: 2020   Prepared by: Brooke Kobi     Exercises  Supine Bridge - 10 reps - 1 sets - 2x daily - 7x weekly  Clamshell - 6 reps - 1 sets - 10 hold - 2x daily - 7x weekly  Straight Leg Raise - 10 reps - 1 sets - 2x daily - 7x weekly  Straight Leg Raise with External Rotation - 10 reps - 1 sets - 2x daily - 7x weekly  Hooklying Isometric Hip Flexion - 10 reps - 1 sets - 5 hold - 2x daily - 7x weekly    Exercise/Equipment Resistance/Repetitions Other comments   TG  sidelying  x5 minutes  x1 min each    Bridge 10x5\" HEP   SLR 10x B with pauses HEP   SLR with ER 10x B HEP   Clamshell  10x5\" R HEP          rockerboard 1' rocking, 1' balance each direction    SLS with LE on ball 2x30\" B    Monster walk x4 laps orange tband HEP   Side step with SLS hold and tband 15x B    FSU 6\" step x4 reps R with mirror cues Increased pain, hip ADD/IR moment    B shld ext with minisquat and tband around knees NV    multihip machine 15# x10

## 2020-08-27 ENCOUNTER — HOSPITAL ENCOUNTER (OUTPATIENT)
Dept: PHYSICAL THERAPY | Age: 70
Setting detail: THERAPIES SERIES
Discharge: HOME | End: 2020-08-27
Payer: MEDICARE

## 2020-08-27 PROCEDURE — 97110 THERAPEUTIC EXERCISES: CPT

## 2020-08-27 PROCEDURE — 97140 MANUAL THERAPY 1/> REGIONS: CPT

## 2020-08-27 NOTE — FLOWSHEET NOTE
Physical Therapy Daily Treatment Note    Date:  2020    Patient Name:  Vito Champion    :  1950  MRN: [id number]  Restrictions/Precautions:    Medical/Treatment Diagnosis Information:  · Diagnosis: B knee pain, chondromalacia of patellae  Insurance/Certification information:  PT Insurance Information: Medicare and [facility] - no precert, medical necessity  Physician Information:  Referring Practitioner: STACIE Melgoza  Plan of care signed (Y/N):  N  Visit# / total visits:       G-Code (if applicable):              Medicare Cap (if applicable):  238 = total amount used, updated 2020    Time in:   10:15      Timed Treatment: 45 Total Treatment Time:  45  Time out: 11:00  ________________________________________________________________________________________    Pain Level:    0/10  SUBJECTIVE:  Pt reports that her back has been feeling pretty awful the past few days so she has rested her body. This has helped her knee, but knows that if she walks hills or stairs that her knee will hurt. OBJECTIVE:     Access Code: [email or web address]   URL: [email or web address]   Date: 2020   Prepared by: Jos Vincent     Exercises  Supine Bridge - 10 reps - 1 sets - 2x daily - 7x weekly  Clamshell - 6 reps - 1 sets - 10 hold - 2x daily - 7x weekly  Straight Leg Raise - 10 reps - 1 sets - 2x daily - 7x weekly  Straight Leg Raise with External Rotation - 10 reps - 1 sets - 2x daily - 7x weekly  Hooklying Isometric Hip Flexion - 10 reps - 1 sets - 5 hold - 2x daily - 7x weekly    Exercise/Equipment Resistance/Repetitions Other comments   TG  sidelying  x5 minutes  x1 min each    TA set with BP cuff Until control demo'd    Prone triple extension  10x5\" B    Prone knee flexion with ball between feet 15x    Bridge  Bridge with hip ADD ball  Bridge with hip ABD band 10x5\"  10x  10x HEP   SLR 10x B with pauses HEP   SLR with ER 10x B HEP   Clamshell  10x5\" R HEP          rockerboard 1' rocking, 1' balance each direction    SLS with LE on ball 2x30\" B    Monster walk x4 laps orange tband HEP   Side step with SLS hold and tband    FSU Increased pain, hip ADD/IR moment    B shld ext with minisquat and tband around knees NV    multihip machine 15# x10 reps B                                                          Other Therapeutic Activities:  Education regarding POC including demonstration with models of anatomy and physiology in order to maximize benefits of treatment     Manual Treatments:          Tibial rotation gr III mobs in hooklying  Varus/valgus mobilization with and without knee movement  EOP tibial distraction    Shotgun technique  Gr V long leg distraction (performed by Justin Daley, PT, consent given)    Total manual 10 minutes     Modalities:      Test/Measurements:  See initial eval       ASSESSMENT:   Pt reported that her back and hips felt better \"than they have in a long time\" post session. Progress as pt tolerates. Treatment/Activity Tolerance:   [x]Patient tolerated treatment well [] Patient limited by fatique  []Patient limited by pain [] Patient limited by other medical complications  [] Other:     Goals:          Long term goals  Time Frame for Long term goals : 4 weeks  Long term goal 1: Pt will be independent with hEP  Long term goal 2: Pt will report 50% improvement in frequency and intensity of pain  Long term goal 3: Pt will demonstrate WFL B LE strength  Long term goal 4: Pt will ascend/descend stairs recirocally without limitation  Long term goal 5: Pt will return to exercise program without limitation     Plan: [x] Continue per plan of care [] Alter current plan (see comments)   [] Plan of care initiated [] Hold pending MD visit [] Discharge      Plan for Next Session:  Correction of biomechanical dysfunctions as they present on visit. Restore proper motor firing pattern and functional muscle activation as presented on visit. Progress as tolerates.     Re-Certification Due Date: Signature:  Lazara Osuna , PT , DPT [id number]

## 2020-09-01 ENCOUNTER — HOSPITAL ENCOUNTER (OUTPATIENT)
Dept: PHYSICAL THERAPY | Age: 70
Setting detail: THERAPIES SERIES
Discharge: HOME | End: 2020-09-01
Payer: MEDICARE

## 2020-09-01 PROCEDURE — 97140 MANUAL THERAPY 1/> REGIONS: CPT

## 2020-09-01 PROCEDURE — 97110 THERAPEUTIC EXERCISES: CPT

## 2020-09-01 NOTE — FLOWSHEET NOTE
Physical Therapy Daily Treatment Note    Date:  2020    Patient Name:  Cyrus Logan    :  1950  MRN: [id number]  Restrictions/Precautions:    Medical/Treatment Diagnosis Information:  · Diagnosis: B knee pain, chondromalacia of patellae  Insurance/Certification information:  PT Insurance Information: Medicare and [facility] - no precert, medical necessity  Physician Information:  Referring Practitioner: STACIE Alexander  Plan of care signed (Y/N):  N  Visit# / total visits:       G-Code (if applicable):              Medicare Cap (if applicable):  738 = total amount used, updated 2020    Time in:   8:45      Timed Treatment: 45 Total Treatment Time:  45  Time out: 9:30  ________________________________________________________________________________________    Pain Level:    0/10  SUBJECTIVE:  Pt reports that her back/pelvis has felt improved since last visit. Walked a mile a few different times and has only had a slight \"twinge\" in her knee. OBJECTIVE:     Access Code: [email or web address]   URL: [email or web address]   Date: 2020   Prepared by: Wesley Linares     Exercises  Supine Bridge - 10 reps - 1 sets - 2x daily - 7x weekly  Clamshell - 6 reps - 1 sets - 10 hold - 2x daily - 7x weekly  Straight Leg Raise - 10 reps - 1 sets - 2x daily - 7x weekly  Straight Leg Raise with External Rotation - 10 reps - 1 sets - 2x daily - 7x weekly  Hooklying Isometric Hip Flexion - 10 reps - 1 sets - 5 hold - 2x daily - 7x weekly    Exercise/Equipment Resistance/Repetitions Other comments   TG  sidelying  x5 minutes  x1 min each    TA set with BP cuff Until control demo'd    Prone triple extension  10x5\" B    Prone knee flexion with ball between feet 15x    Bridge  Bridge with hip ADD ball  Bridge with hip ABD band 10x5\"  10x  10x HEP   SLR  HEP   SLR with ER  HEP   Clamshell   HEP          rockerboard 1' rocking, 1' balance each direction    SLS with LE on ball 2x30\" B    Monster walk x4 laps orange

## 2020-09-03 ENCOUNTER — HOSPITAL ENCOUNTER (OUTPATIENT)
Dept: PHYSICAL THERAPY | Age: 70
Setting detail: THERAPIES SERIES
Discharge: HOME | End: 2020-09-03
Payer: MEDICARE

## 2020-09-03 PROCEDURE — 97110 THERAPEUTIC EXERCISES: CPT

## 2020-09-03 NOTE — FLOWSHEET NOTE
Side step with SLS hold and tband    FSU Increased pain, hip ADD/IR moment    B shld ext with minisquat and tband around knees NV    multihip machine     3 way hip with sport cord 15x B                                                     Other Therapeutic Activities:  Education regarding POC including demonstration with models of anatomy and physiology in order to maximize benefits of treatment     Manual Treatments:          Modalities:      Test/Measurements:  See initial eval       ASSESSMENT:   Pt reported that her back and hips felt better \"than they have in a long time\" post session. Progress as pt tolerates. Treatment/Activity Tolerance:   [x]Patient tolerated treatment well [] Patient limited by fatique  []Patient limited by pain [] Patient limited by other medical complications  [] Other:     Goals:          Long term goals  Time Frame for Long term goals : 4 weeks  Long term goal 1: Pt will be independent with hEP  Long term goal 2: Pt will report 50% improvement in frequency and intensity of pain  Long term goal 3: Pt will demonstrate WFL B LE strength  Long term goal 4: Pt will ascend/descend stairs recirocally without limitation  Long term goal 5: Pt will return to exercise program without limitation     Plan: [x] Continue per plan of care [] Alter current plan (see comments)   [] Plan of care initiated [] Hold pending MD visit [] Discharge      Plan for Next Session:  Correction of biomechanical dysfunctions as they present on visit. Restore proper motor firing pattern and functional muscle activation as presented on visit. Progress as tolerates.     Re-Certification Due Date:         Signature:  Daquan Myles , PT , DPT [id number]

## 2020-09-08 ENCOUNTER — HOSPITAL ENCOUNTER (OUTPATIENT)
Dept: PHYSICAL THERAPY | Age: 70
Setting detail: THERAPIES SERIES
Discharge: HOME | End: 2020-09-08
Payer: MEDICARE

## 2020-09-08 PROCEDURE — 97140 MANUAL THERAPY 1/> REGIONS: CPT

## 2020-09-08 PROCEDURE — 97110 THERAPEUTIC EXERCISES: CPT

## 2020-09-08 NOTE — FLOWSHEET NOTE
Physical Therapy Daily Treatment Note    Date:  2020    Patient Name:  Irasema Davison    :  1950  MRN: [id number]  Restrictions/Precautions:    Medical/Treatment Diagnosis Information:  · Diagnosis: B knee pain, chondromalacia of patellae  Insurance/Certification information:  PT Insurance Information: Medicare and [facility] - no precert, medical necessity  Physician Information:  Referring Practitioner: STACIE Jean  Plan of care signed (Y/N):  N  Visit# / total visits:       G-Code (if applicable):              Medicare Cap (if applicable):  846 = total amount used, updated 2020    Time in:   8:45      Timed Treatment: 45 Total Treatment Time:  45  Time out: 9:30  ________________________________________________________________________________________    Pain Level:    0/10  SUBJECTIVE:  Pt reports that she is feeling better about keeping her pelvis level. OBJECTIVE:     Access Code: [email or web address]   URL: [email or web address]   Date: 2020   Prepared by: Durga Blake     Exercises  Supine Bridge - 10 reps - 1 sets - 2x daily - 7x weekly  Clamshell - 6 reps - 1 sets - 10 hold - 2x daily - 7x weekly  Straight Leg Raise - 10 reps - 1 sets - 2x daily - 7x weekly  Straight Leg Raise with External Rotation - 10 reps - 1 sets - 2x daily - 7x weekly  Hooklying Isometric Hip Flexion - 10 reps - 1 sets - 5 hold - 2x daily - 7x weekly    Exercise/Equipment Resistance/Repetitions Other comments   TG  sidelying  x5 minutes  x1 min each    TA set with BP cuff Until control demo'd    Prone triple extension  10x5\" B    Prone knee flexion with ball between feet 15x    Prone hip extension 10x B    Bridge  Bridge with hip ADD ball  Bridge with hip ABD band 10x5\"  10x  10x HEP   SLR  HEP   SLR with ER  HEP   Clamshell   HEP          rockerboard 1' rocking, 1' balance each direction    SLS with LE on ball 2x30\" B    Monster walk  HEP   Side step with SLS hold and tband    FSU Increased pain, hip ADD/IR moment    B shld ext with minisquat and tband around knees NV    multihip machine 15# x10 reps B    3 way hip with sport cord                                                      Other Therapeutic Activities:  Education regarding POC including demonstration with models of anatomy and physiology in order to maximize benefits of treatment     Manual Treatments:        Side-lying lumbopelvic roll   MFR to L lumbar paraspinals  Total manual 10 minutes   Modalities:      Test/Measurements:  See initial eval       ASSESSMENT:   Pt reported that her back and hips felt better \"than they have in a long time\" post session. Continues to require cueing to activation R gluteals in standing to maintain pelvic neutrality. Progress as pt tolerates. Treatment/Activity Tolerance:   [x]Patient tolerated treatment well [] Patient limited by fatique  []Patient limited by pain [] Patient limited by other medical complications  [] Other:     Goals:          Long term goals  Time Frame for Long term goals : 4 weeks  Long term goal 1: Pt will be independent with hEP  Long term goal 2: Pt will report 50% improvement in frequency and intensity of pain  Long term goal 3: Pt will demonstrate WFL B LE strength  Long term goal 4: Pt will ascend/descend stairs recirocally without limitation  Long term goal 5: Pt will return to exercise program without limitation     Plan: [x] Continue per plan of care [] Alter current plan (see comments)   [] Plan of care initiated [] Hold pending MD visit [] Discharge      Plan for Next Session:  Correction of biomechanical dysfunctions as they present on visit. Restore proper motor firing pattern and functional muscle activation as presented on visit. Progress as tolerates.     Re-Certification Due Date:         Signature:  Lazara Osuna PT , DPT [id number]

## 2020-09-09 ENCOUNTER — OFFICE VISIT (OUTPATIENT)
Dept: ORTHOPEDIC SURGERY | Age: 70
End: 2020-09-09
Payer: MEDICARE

## 2020-09-09 VITALS — WEIGHT: 174 LBS | HEIGHT: 69 IN | BODY MASS INDEX: 25.77 KG/M2

## 2020-09-09 PROCEDURE — 4040F PNEUMOC VAC/ADMIN/RCVD: CPT | Performed by: PHYSICIAN ASSISTANT

## 2020-09-09 PROCEDURE — G8427 DOCREV CUR MEDS BY ELIG CLIN: HCPCS | Performed by: PHYSICIAN ASSISTANT

## 2020-09-09 PROCEDURE — G8400 PT W/DXA NO RESULTS DOC: HCPCS | Performed by: PHYSICIAN ASSISTANT

## 2020-09-09 PROCEDURE — G8417 CALC BMI ABV UP PARAM F/U: HCPCS | Performed by: PHYSICIAN ASSISTANT

## 2020-09-09 PROCEDURE — 1090F PRES/ABSN URINE INCON ASSESS: CPT | Performed by: PHYSICIAN ASSISTANT

## 2020-09-09 PROCEDURE — 3017F COLORECTAL CA SCREEN DOC REV: CPT | Performed by: PHYSICIAN ASSISTANT

## 2020-09-09 PROCEDURE — 1036F TOBACCO NON-USER: CPT | Performed by: PHYSICIAN ASSISTANT

## 2020-09-09 PROCEDURE — 1123F ACP DISCUSS/DSCN MKR DOCD: CPT | Performed by: PHYSICIAN ASSISTANT

## 2020-09-09 PROCEDURE — 99214 OFFICE O/P EST MOD 30 MIN: CPT | Performed by: PHYSICIAN ASSISTANT

## 2020-09-09 NOTE — PROGRESS NOTES
History of present illness:   Ms. Evelina Ramírez  is a pleasant 71 y.o. female with a PMH of thyroid disease, melanoma, HTN, leukemia, benign tumor of spinal cord, and fibromyalgia kindly referred by Dr. Heather Guerrero for consultation regarding her LBP. She notes intermittent back pain for several years, but states her current pain began insidiously about 6 weeks ago after a fall. Her pain has improved some since then. She rates her back pain 0-8/10 and leg pain 1/10. She describes the pain as aching. The pain is localized to her lower lumbar spine and does not radiate. She denies lower extremity pain, numbness or weakness. She denies saddle numbness or bowel or bladder dysfunction. She denies significant balance disruption, gait abnormality, or upper extremity symptoms. Her back pain is substantially worst with standing and bending, improved with sitting or lying down. She has tried 7 visits of physical therapy with some relief, although her physical therapy was initially started for her knees. She takes Mobic. She has tried epidural injections in the distant past.    Past medical history:  Her past medical history has been reviewed and is significant for thyroid disease, melanoma, HTN, leukemia, benign tumor of spinal cord, and fibromyalgia. Her past surgical history has been reviewed and is non-contributory to her present illness. Her medications and allergies were reviewed. Her social history has been reviewed and she is a former smoker. Her family history has been reviewed is significant for lung cancer. Review of symptoms:  Patient's review of symptoms was reviewed and is significant for back pain and negative for recent weight loss, fatigue, chills, visual disturbances, blood in stool or urine, recent infection, chest pain, or shortness of breath. All other ROS were negative. Physical examination:  Ms. Duncan Lindsay most recent vitals:      Body mass index is 25.7 additional physical therapy. She was also given information for Lynne exercises to try at home. She will call for a new MRI of her lumbar spine if her symptoms persist or worsen, or plateau at an unacceptable level.      Mari Balderas PA-C

## 2020-09-10 ENCOUNTER — HOSPITAL ENCOUNTER (OUTPATIENT)
Dept: PHYSICAL THERAPY | Age: 70
Setting detail: THERAPIES SERIES
Discharge: HOME | End: 2020-09-10
Payer: MEDICARE

## 2020-09-10 PROCEDURE — 97140 MANUAL THERAPY 1/> REGIONS: CPT

## 2020-09-10 PROCEDURE — 97110 THERAPEUTIC EXERCISES: CPT

## 2020-09-10 NOTE — FLOWSHEET NOTE
Physical Therapy Daily Treatment/Discharge Note    Date:  9/10/2020    Patient Name:  Omid Barnhart    :  1950  MRN: [id number]  Restrictions/Precautions:    Medical/Treatment Diagnosis Information:  · Diagnosis: B knee pain, chondromalacia of patellae  Insurance/Certification information:  PT Insurance Information: Medicare and [facility] - no precert, medical necessity  Physician Information:  Referring Practitioner: STACIE Navarrete  Plan of care signed (Y/N):  N  Visit# / total visits:       G-Code (if applicable):          LEFS 57/80 (32 at eval)    Medicare Cap (if applicable):  915 = total amount used, updated 9/10/2020    Time in:   8:00      Timed Treatment: 45 Total Treatment Time:  45  Time out: 8:45  ________________________________________________________________________________________    Pain Level:    0/10  SUBJECTIVE:  Pt reports that her knees are doing much better, but that she is not walking as much. When she does walk, she is not limited by her knees any longer. [place] time this [place]. OBJECTIVE:     Access Code: [email or web address]   URL: [email or web address]   Date: 2020   Prepared by: Leonor Chapman     Exercises  Supine Bridge - 10 reps - 1 sets - 2x daily - 7x weekly  Clamshell - 6 reps - 1 sets - 10 hold - 2x daily - 7x weekly  Straight Leg Raise - 10 reps - 1 sets - 2x daily - 7x weekly  Straight Leg Raise with External Rotation - 10 reps - 1 sets - 2x daily - 7x weekly  Hooklying Isometric Hip Flexion - 10 reps - 1 sets - 5 hold - 2x daily - 7x weekly    Exercise/Equipment Resistance/Repetitions Other comments   TG  sidelying  x5 minutes  x1 min each    TA set with BP cuff Until control demo'd    Prone triple extension  10x5\" B    Prone knee flexion with ball between feet 15x    Prone hip extension 10x B    Bridge  Bridge with hip ADD ball  Bridge with hip ABD band 10x5\"  10x  10x HEP   SLR  HEP   SLR with ER  HEP   Clamshell   HEP other medical complications  [] Other:     Goals:          Long term goals  Time Frame for Long term goals : 4 weeks  Long term goal 1: Pt will be independent with hEP - met  Long term goal 2: Pt will report 50% improvement in frequency and intensity of pain - met, pt reporting 95% improvement  Long term goal 3: Pt will demonstrate WFL B LE strength - improving, see above  Long term goal 4: Pt will ascend/descend stairs recirocally without limitation - met  Long term goal 5: Pt will return to exercise program without limitation - met with exception of walking at same frequency    Plan: [] Continue per plan of care [] Alter current plan (see comments)   [] Plan of care initiated [] Hold pending MD visit [x] Discharge      Plan for Next Session:  Correction of biomechanical dysfunctions as they present on visit. Restore proper motor firing pattern and functional muscle activation as presented on visit. Progress as tolerates. Re-Certification Due Date:         Signature:  Durga Blake PT , DPT [id number]      If you have any questions or concerns, please don't hesitate to call.   Thank you for your referral.    Physician Signature:________________________________Date:__________________  By signing above, therapists plan is approved by physician    Please return by fax to [phone]

## 2020-09-15 ENCOUNTER — HOSPITAL ENCOUNTER (OUTPATIENT)
Dept: PHYSICAL THERAPY | Age: 70
Setting detail: THERAPIES SERIES
Discharge: HOME | End: 2020-09-15
Payer: MEDICARE

## 2020-09-15 PROCEDURE — 97110 THERAPEUTIC EXERCISES: CPT

## 2020-09-15 PROCEDURE — 97161 PT EVAL LOW COMPLEX 20 MIN: CPT

## 2020-09-15 ASSESSMENT — PAIN DESCRIPTION - PAIN TYPE: TYPE: CHRONIC PAIN

## 2020-09-15 ASSESSMENT — PAIN SCALES - GENERAL: PAINLEVEL_OUTOF10: 3

## 2020-09-15 ASSESSMENT — PAIN DESCRIPTION - LOCATION: LOCATION: BACK

## 2020-09-15 ASSESSMENT — PAIN DESCRIPTION - DESCRIPTORS: DESCRIPTORS: SHARP;DISCOMFORT

## 2020-09-15 ASSESSMENT — PAIN DESCRIPTION - ORIENTATION: ORIENTATION: LOWER

## 2020-09-15 ASSESSMENT — PAIN DESCRIPTION - FREQUENCY: FREQUENCY: INTERMITTENT

## 2020-09-15 NOTE — PROGRESS NOTES
Physical Therapy  Initial Assessment  Date: 9/15/2020  Patient Name: Janny Scherer  MRN: [id number]  : 1950          Restrictions   none per pt/rx    Subjective   General  Chart Reviewed: Yes  Family / Caregiver Present: No  Referring Practitioner: STACIE Oseguera  Referral Date : 20  Diagnosis: lumbar spondylosis  Follows Commands: Within Functional Limits  PT Visit Information  Onset Date: 20  PT Insurance Information: Medicare and [facility] - no precert, medical necessity  Subjective  Subjective: Pt reports that she has had back pain for years, but felt that it has been progressing to the point that it is interfering with her life. Standing/cooking is limited to about 40 minutes before she has to sit down - LOVES to cook so could typically be in her kitchen for 3-4 hours at time. Sitting can be challenging too. Walking brings on a \"pressure\" and she likes to walk for exericse - \"I have to take shorter steps\". Pain Screening  Patient Currently in Pain: Yes  Pain Assessment  Pain Level: 3  Pain Type: Chronic pain  Pain Location: Back  Pain Orientation: Lower(more on the right side)  Pain Descriptors: Sharp;Discomfort  Pain Frequency: Intermittent  Vital Signs  Patient Currently in Pain: Yes    Objective     Observation/Palpation  Posture: Good  Palpation: increased TTP B lumbar paraspinals/QL, L PSIS  Observation: Pt stands with increased pes planus R compared to L, B genuvarus (mild), decreased lumbar lordosis. During ambulation, pt demonstrates improved hip extension, decreased counter rotation of lumbar spine/arm swing. Narrow stance B (almost crossing over gait). Body Mechanics: DL squat quad dominant  (when cued correctly, increased back pain); SLS improved from last evaluation    PROM RLE (degrees)  RLE PROM: WNL  PROM LLE (degrees)  LLE PROM: WNL  Spine  Lumbar:  WNL with pain on R during extension only  Special Tests: (-) flick test B; negative pelvic asymmetry    Strength RLE  Strength RLE: Exception  Comment: hip IR/ER 4/5, hip ext 4/5  Strength LLE  Strength LLE: Exception  Comment: hip IR/ER 4/5, hip ext 4/5     Additional Measures  Special Tests: (+) TETE on L for R lower back pain/L hip  Other: DTR 2+ B achilles and patellar tendons, (-) clonus, (-) Babinski  Sensation  Overall Sensation Status: WNL(to light touch)         Assessment   Conditions Requiring Skilled Therapeutic Intervention  Body structures, Functions, Activity limitations: Decreased ROM; Decreased strength;Decreased high-level IADLs;Decreased posture; Increased pain  Assessment: Pt is a 72 y/o female who presents with chronic back pain secondary to impaired lumbar stabilization, aberrant motion patterns, and scoliosis of lumbar spine. Recommend skilled, outpatient PT to address deficits and to attain below-stated functional goals.   Prognosis: Good  Decision Making: Low Complexity  Activity Tolerance  Activity Tolerance: Patient Tolerated treatment well         Plan   Plan  Times per week: 2x/week for 4 weeks to include ther-ex, neuro re-ed/balance, postural training, gait training, therapeutic activity, manual therapy, pt education, and modalities PRN    G-Code   Modified Oswestry 42% disability    Goals  Long term goals  Time Frame for Long term goals : 4 weeks  Long term goal 1: Pt will be independent with hEP  Long term goal 2: Pt will report 50% improvement in frequency and intensity of back pain  Long term goal 3: Pt will demonstrate WFL B LE strength  Long term goal 4: Pt will improve Modified Oswestry by 7 points or greater to indicate significant change  Long term goal 5: Pt will return to exercise program without limitation from back pain       Therapy Time   Individual Concurrent Group Co-treatment   Time In 0800         Time Out 0845         Minutes 45         Timed Code Treatment Minutes: [address], [place] , DPT [id number]        Outpatient Physical Therapy  Phone: [phone] Fax: [phone]     To: STACIE Mayo     From: Tamara Quan PT     Patient: [facility]      : 1950  Diagnosis: lumbar spondylosis   Date: 9/15/2020  Treatment Diagnosis:      Physical Therapy Certification/Re-Certification Form  Dear Jose L Montgomery,  The following patient has been evaluated for physical therapy services and for therapy to continue, Medicare requires monthly physician review of the treatment plan. Please review the attached evaluation and/or summary of the patient's plan of care, and verify that you agree therapy should continue by signing the attached document and sending it back to our office. Plan of Care/Treatment to date:  [x] Therapeutic Exercise   [] Modalities:  [x] Therapeutic Activity     [] Ultrasound  [] Electrical Stimulation   [x] Gait Training      [] Cervical Traction [] Lumbar Traction  [x] Neuromuscular Re-education  [] Hot/Coldpack [] Iontophoresis    [x] Instruction in HEP      Other:  [x] Manual Therapy       []                        [] Aquatic Therapy       []                      ? Frequency/Duration:  # Days per week: [] 1 day # Weeks: [] 1 week [] 5 weeks      [x] 2 days? [] 2 weeks [] 6 weeks     [] 3 days   [] 3 weeks [] 7 weeks     [] 4 days   [x] 4 weeks [] 8 weeks    Rehab Potential: [] Excellent [x] Good [] Fair  [] Poor       Electronically signed by:  Tamara Quan PT , DPT [id number]      If you have any questions or concerns, please don't hesitate to call.   Thank you for your referral.    Physician Signature:________________________________Date:__________________  By signing above, therapists plan is approved by physician

## 2020-09-15 NOTE — FLOWSHEET NOTE
Physical Therapy Daily Treatment Note    Date:  9/15/2020    Patient Name:  Shayla Grande    :  1950  MRN: [id number]  Restrictions/Precautions:    Medical/Treatment Diagnosis Information:  · Diagnosis: lumbar spondylosis  Insurance/Certification information:  PT Insurance Information: Medicare and [facility] - no precert, medical necessity  Physician Information:  Referring Practitioner: STACIE Moses  Plan of care signed (Y/N):  N  Visit# / total visits:       G-Code (if applicable):              Medicare Cap (if applicable):  349 = total amount used, updated 9/15/2020    Time in:   8:00      Timed Treatment: 15 Total Treatment Time:  45  Time out: 8:45  ________________________________________________________________________________________    Pain Level:    /10  SUBJECTIVE:  See initial eval    OBJECTIVE:     Exercise/Equipment Resistance/Repetitions Other comments          Hand heel rock 10x HEP   Cat/camel 10x HEP   LTR 10x  HEP          TA set with BP NV    hooklying lumbar stab NV    3-way ball compression NV    Quadruped multifidus NV             Standing multifidus NV    multihip machine NV    Post sling with step up NV                                                Other Therapeutic Activities:  Education regarding POC including demonstration with models of anatomy and physiology in order to maximize benefits of treatment    Manual Treatments:         Consider opening lumbar spine B  B long leg pull  Consider awais technique  Consider MFR B lumbar spine/QL    Modalities:      Test/Measurements:  See initial eval       ASSESSMENT:    See initial eval     Treatment/Activity Tolerance:   [x]Patient tolerated treatment well [] Patient limited by fatique  []Patient limited by pain [] Patient limited by other medical complications  [] Other:     Goals:          Long term goals  Time Frame for Long term goals : 4 weeks  Long term goal 1: Pt will be independent with hEP  Long term goal 2: Pt will report 50% improvement in frequency and intensity of back pain  Long term goal 3: Pt will demonstrate WFL B LE strength  Long term goal 4: Pt will improve Modified Oswestry by 7 points or greater to indicate significant change  Long term goal 5: Pt will return to exercise program without limitation from back pain     Plan: [] Continue per plan of care [] Alter current plan (see comments)   [x] Plan of care initiated [] Hold pending MD visit [] Discharge      Plan for Next Session:  Correction of biomechanical dysfunctions as they present on visit. Restore proper motor firing pattern and functional muscle activation as presented on visit. Progress as tolerates.     Re-Certification Due Date:         Signature:  An De La Garza , PT

## 2020-09-17 ENCOUNTER — HOSPITAL ENCOUNTER (OUTPATIENT)
Dept: PHYSICAL THERAPY | Age: 70
Setting detail: THERAPIES SERIES
Discharge: HOME | End: 2020-09-17
Payer: MEDICARE

## 2020-09-17 PROCEDURE — 97110 THERAPEUTIC EXERCISES: CPT

## 2020-09-17 PROCEDURE — 97140 MANUAL THERAPY 1/> REGIONS: CPT

## 2020-09-17 NOTE — FLOWSHEET NOTE
Physical Therapy Daily Treatment Note    Date:  2020    Patient Name:  Marjorie Peguero    :  1950  MRN: [id number]  Restrictions/Precautions:    Medical/Treatment Diagnosis Information:  · Diagnosis: lumbar spondylosis  Insurance/Certification information:  PT Insurance Information: Medicare and [facility] - no precert, medical necessity  Physician Information:  Referring Practitioner: STACIE Light  Plan of care signed (Y/N):  N  Visit# / total visits:       G-Code (if applicable):              Medicare Cap (if applicable):  152 = total amount used, updated 2020    Time in:   8:00      Timed Treatment: 45 Total Treatment Time:  45  Time out: 8:45  ________________________________________________________________________________________    Pain Level:   2 /10  SUBJECTIVE:  Pt reports that she got her new orthotics and they feel really good. Back pain has been improved, but also \"not stressing it\". OBJECTIVE:     Exercise/Equipment Resistance/Repetitions Other comments          Hand heel rock 10x HEP   Cat/camel 10x HEP   LTR 10x  HEP          TA set with BP   - BKFO   - march Until control demo'd      hooklying lumbar stab 2x1 min    3-way ball compression 10x10\" each direction    Quadruped multifidus NV             Standing multifidus 15x B  With walk out 10x B    multihip machine ABD 15# x15 reps B    Post sling with step up 15x B    Lateral sling 15x B                                         Other Therapeutic Activities:  Education regarding POC including demonstration with models of anatomy and physiology in order to maximize benefits of treatment    Manual Treatments:         Gr IV opening lumbar spine B without cavitation or thrust  B long leg pull  MFR B lumbar spine/QL    Total manual 10 minutes    Modalities:      Test/Measurements:  See initial eval       ASSESSMENT:    Pt demonstrated good lumbopelvic stabilization this date, but struggled with single leg stance activities. Treatment/Activity Tolerance:   [x]Patient tolerated treatment well [] Patient limited by fatique  []Patient limited by pain [] Patient limited by other medical complications  [] Other:     Goals:          Long term goals  Time Frame for Long term goals : 4 weeks  Long term goal 1: Pt will be independent with hEP  Long term goal 2: Pt will report 50% improvement in frequency and intensity of back pain  Long term goal 3: Pt will demonstrate WFL B LE strength  Long term goal 4: Pt will improve Modified Oswestry by 7 points or greater to indicate significant change  Long term goal 5: Pt will return to exercise program without limitation from back pain     Plan: [x] Continue per plan of care [] Alter current plan (see comments)   [] Plan of care initiated [] Hold pending MD visit [] Discharge      Plan for Next Session:  Correction of biomechanical dysfunctions as they present on visit. Restore proper motor firing pattern and functional muscle activation as presented on visit. Progress as tolerates.     Re-Certification Due Date:         Signature:  Durga Blake PT

## 2020-09-22 ENCOUNTER — HOSPITAL ENCOUNTER (OUTPATIENT)
Dept: PHYSICAL THERAPY | Age: 70
Setting detail: THERAPIES SERIES
Discharge: HOME | End: 2020-09-22
Payer: MEDICARE

## 2020-09-22 PROCEDURE — 97112 NEUROMUSCULAR REEDUCATION: CPT

## 2020-09-22 PROCEDURE — 97110 THERAPEUTIC EXERCISES: CPT

## 2020-09-22 PROCEDURE — 97140 MANUAL THERAPY 1/> REGIONS: CPT

## 2020-09-22 NOTE — FLOWSHEET NOTE
Physical Therapy Daily Treatment Note    Date:  2020    Patient Name:  Malena Salazar    :  1950  MRN: [id number]  Restrictions/Precautions:    Medical/Treatment Diagnosis Information:  · Diagnosis: lumbar spondylosis  Insurance/Certification information:  PT Insurance Information: Medicare and [facility] - no precert, medical necessity  Physician Information:  Referring Practitioner: STACIE Garrett  Plan of care signed (Y/N):  N  Visit# / total visits: 3/8     G-Code (if applicable):              Medicare Cap (if applicable):  4809 = total amount used, updated 2020    Time in:   8:30     Timed Treatment: 45 Total Treatment Time:  45  Time out: 9:15  ________________________________________________________________________________________    Pain Level:   4-5/10  SUBJECTIVE:  Pt reports that the change in the weather has flared up a lot of her joints. Took a walk in [facility] (about a mile) and was sore, had to sit a few times while making dinner, and used ice. Bunion has also been flared up.      OBJECTIVE:     Exercise/Equipment Resistance/Repetitions Other comments          Hand heel rock  HEP   Cat/camel  HEP   LTR  HEP          TA set with BP   - BKFO   - march Until control demo'd      hooklying lumbar stab 2x1 min    3-way ball compression 10x10\" each direction    Quadruped multifidus NV    Prone hip IR/ER neuro re-ed x2 mins L    Supine hip IR/ER neuro re-ed x2 mins L    Standing multifidus 15x B  With walk out 10x B    multihip machine ABD 15# x15 reps B    Post sling with step up 15x B    Lateral sling 15x B                  TG   x6 mins                    Other Therapeutic Activities:  Education regarding POC including demonstration with models of anatomy and physiology in order to maximize benefits of treatment    Manual Treatments:         Gr IV opening lumbar spine B with cavitation on set up  B long leg pull  MFR B lumbar spine/QL  Gr V long axis distraction L LE (performed by Scott Huang, PTRyan    Total manual 10 minutes    Modalities:      Test/Measurements:  See initial eval       ASSESSMENT:    Pt demonstrated good lumbopelvic stabilization this date, but struggled with single leg stance activities. Progress as pt tolerates. Treatment/Activity Tolerance:   [x]Patient tolerated treatment well [] Patient limited by fatique  []Patient limited by pain [] Patient limited by other medical complications  [] Other:     Goals:          Long term goals  Time Frame for Long term goals : 4 weeks  Long term goal 1: Pt will be independent with hEP  Long term goal 2: Pt will report 50% improvement in frequency and intensity of back pain  Long term goal 3: Pt will demonstrate WFL B LE strength  Long term goal 4: Pt will improve Modified Oswestry by 7 points or greater to indicate significant change  Long term goal 5: Pt will return to exercise program without limitation from back pain     Plan: [x] Continue per plan of care [] Alter current plan (see comments)   [] Plan of care initiated [] Hold pending MD visit [] Discharge      Plan for Next Session:  Correction of biomechanical dysfunctions as they present on visit. Restore proper motor firing pattern and functional muscle activation as presented on visit. Progress as tolerates.     Re-Certification Due Date:         Signature:  Hero Lozano , PT

## 2020-09-24 ENCOUNTER — HOSPITAL ENCOUNTER (OUTPATIENT)
Dept: PHYSICAL THERAPY | Age: 70
Setting detail: THERAPIES SERIES
Discharge: HOME | End: 2020-09-24
Payer: MEDICARE

## 2020-09-24 PROCEDURE — 97110 THERAPEUTIC EXERCISES: CPT

## 2020-09-24 PROCEDURE — 97112 NEUROMUSCULAR REEDUCATION: CPT

## 2020-09-24 PROCEDURE — 97140 MANUAL THERAPY 1/> REGIONS: CPT

## 2020-09-24 NOTE — FLOWSHEET NOTE
Physical Therapy Daily Treatment Note    Date:  2020    Patient Name:  Bashir Morris    :  1950  MRN: [id number]  Restrictions/Precautions:    Medical/Treatment Diagnosis Information:  · Diagnosis: lumbar spondylosis  Insurance/Certification information:  PT Insurance Information: Medicare and [facility] - no precert, medical necessity  Physician Information:  Referring Practitioner: STACIE Cespedes  Plan of care signed (Y/N):  N  Visit# / total visits:      G-Code (if applicable):              Medicare Cap (if applicable):  3684 = total amount used, updated 2020    Time in:   8:30     Timed Treatment: 45 Total Treatment Time:  45  Time out: 9:15  ________________________________________________________________________________________    Pain Level:   1/10  SUBJECTIVE:  Pt reports that she had a great day yesterday and that her hip, pelvic, back and knee pain were all significantly less. Had one \"pop\" in her pelvis with bending over, but was able to walk 1.5 miles without any pain.      OBJECTIVE:     Exercise/Equipment Resistance/Repetitions Other comments          Hand heel rock  HEP   Cat/camel  HEP   LTR  HEP          TA set with BP   - BKFO   - march Until control demo'd      hooklying lumbar stab 2x1 min    3-way ball compression 10x10\" each direction    Quadruped multifidus NV    Prone hip IR/ER neuro re-ed x2 mins L    Supine hip IR/ER neuro re-ed x2 mins L    Standing multifidus 15x B  With walk out 10x B    multihip machine ABD 15# x15 reps B    Post sling with step up 15x B    Lateral sling 15x B                  TG   x6 mins                    Other Therapeutic Activities:  Education regarding POC including demonstration with models of anatomy and physiology in order to maximize benefits of treatment    Manual Treatments:         Gr IV opening lumbar spine B with cavitation on set up  B long leg pull  MFR B lumbar spine/QL  Gr IV sustained long axis distraction L LE with belts

## 2020-09-29 ENCOUNTER — HOSPITAL ENCOUNTER (OUTPATIENT)
Dept: PHYSICAL THERAPY | Age: 70
Setting detail: THERAPIES SERIES
Discharge: HOME | End: 2020-09-29
Payer: MEDICARE

## 2020-09-29 PROCEDURE — 97110 THERAPEUTIC EXERCISES: CPT

## 2020-09-29 PROCEDURE — 97112 NEUROMUSCULAR REEDUCATION: CPT

## 2020-09-29 PROCEDURE — 97140 MANUAL THERAPY 1/> REGIONS: CPT

## 2020-09-29 NOTE — FLOWSHEET NOTE
Physical Therapy Daily Treatment Note    Date:  2020    Patient Name:  Julián Hernandez    :  1950  MRN: [id number]  Restrictions/Precautions:    Medical/Treatment Diagnosis Information:  · Diagnosis: lumbar spondylosis  Insurance/Certification information:  PT Insurance Information: Medicare and [facility] - no precert, medical necessity  Physician Information:  Referring Practitioner: STACIE Donis  Plan of care signed (Y/N):  N  Visit# / total visits:      G-Code (if applicable):              Medicare Cap (if applicable):  0436 = total amount used, updated 2020    Time in:   8:30     Timed Treatment: 45 Total Treatment Time:  45  Time out: 9:15  ________________________________________________________________________________________    Pain Level:   1/10  SUBJECTIVE:  Pt reports that she has had a really good week/weekend. Has not been bothered by her back or knee much at all - able to stand and cook and visit with friends without limitation from back pain.      OBJECTIVE:     Exercise/Equipment Resistance/Repetitions Other comments          Hand heel rock  HEP   Cat/camel  HEP   LTR  HEP          TA set with BP   - BKFO   - march Until control demo'd      hooklying lumbar stab 2x1 min    3-way ball compression     Prone knee flexion 15x    Prone triple extension 10x5\" B    Prone hip IR/ER neuro re-ed x2 mins L    Supine hip IR/ER neuro re-ed x2 mins L    SLS with LE on ball 2x30\" B    Standing multifidus 15x B  With walk out 10x B    multihip machine ABD 15# x15 reps B    Post sling with step up 15x B    Lateral sling 15x B                  TG   x6 mins                    Other Therapeutic Activities:  Education regarding POC including demonstration with models of anatomy and physiology in order to maximize benefits of treatment    Manual Treatments:         Gr IV opening lumbar spine B with cavitation on set up  B long leg pull  MFR B lumbar spine/QL  Gr V sustained long axis distraction L LE (performed by Griselda Knight, PT)    Total manual 10 minutes    Modalities:      Test/Measurements:  See initial eval       ASSESSMENT:    Pt demonstrated good lumbopelvic stabilization this date, but struggled with single leg stance activities. Progress as pt tolerates. Treatment/Activity Tolerance:   [x]Patient tolerated treatment well [] Patient limited by fatique  []Patient limited by pain [] Patient limited by other medical complications  [] Other:     Goals:          Long term goals  Time Frame for Long term goals : 4 weeks  Long term goal 1: Pt will be independent with hEP  Long term goal 2: Pt will report 50% improvement in frequency and intensity of back pain  Long term goal 3: Pt will demonstrate WFL B LE strength  Long term goal 4: Pt will improve Modified Oswestry by 7 points or greater to indicate significant change  Long term goal 5: Pt will return to exercise program without limitation from back pain     Plan: [x] Continue per plan of care [] Alter current plan (see comments)   [] Plan of care initiated [] Hold pending MD visit [] Discharge      Plan for Next Session:  Correction of biomechanical dysfunctions as they present on visit. Restore proper motor firing pattern and functional muscle activation as presented on visit. Progress as tolerates.     Re-Certification Due Date:         Signature:  Joe Patel , PT

## 2020-10-02 ENCOUNTER — HOSPITAL ENCOUNTER (OUTPATIENT)
Dept: PHYSICAL THERAPY | Age: 70
Setting detail: THERAPIES SERIES
Discharge: HOME | End: 2020-10-02
Payer: MEDICARE

## 2020-10-02 PROCEDURE — 97110 THERAPEUTIC EXERCISES: CPT

## 2020-10-02 PROCEDURE — 97112 NEUROMUSCULAR REEDUCATION: CPT

## 2020-10-02 NOTE — FLOWSHEET NOTE
Physical Therapy Daily Treatment Note    Date:  10/2/2020    Patient Name:  Isela Goodman    :  1950  MRN: [id number]  Restrictions/Precautions:    Medical/Treatment Diagnosis Information:  · Diagnosis: lumbar spondylosis  Insurance/Certification information:  PT Insurance Information: Medicare and [facility] - no precert, medical necessity  Physician Information:  Referring Practitioner: STACIE Florez  Plan of care signed (Y/N):  N  Visit# / total visits:       G-Code (if applicable):              Medicare Cap (if applicable):  8483 = total amount used, updated 10/2/2020    Time in:   8:30     Timed Treatment: 45 Total Treatment Time:  45  Time out: 9:15  ________________________________________________________________________________________    Pain Level:   1/10  SUBJECTIVE:  Pt reports that her back and knee have both been feeling great! OBJECTIVE:     Exercise/Equipment Resistance/Repetitions Other comments          Hand heel rock  HEP   Cat/camel  HEP   LTR  HEP          TA set with BP   - BKFO   - march Until control demo'd      hooklying lumbar stab 2x1 min    3-way ball compression 10x10\" each direction    Prone knee flexion 15x    Prone triple extension 10x5\" B    Prone hip IR/ER neuro re-ed x2 mins B    Supine hip IR/ER neuro re-ed x2 mins B    SLS with LE on ball 2x30\" B    Standing multifidus 15x B  With walk out 10x B    multihip machine ABD 15# x15 reps B  EXT 40# x15 reps B    Post sling with step up 15x B    Lateral sling 15x B    B shld ext   15x B           TG   x6 mins                    Other Therapeutic Activities:  Education regarding POC including demonstration with models of anatomy and physiology in order to maximize benefits of treatment    Manual Treatments:           Modalities:      Test/Measurements:  See initial eval       ASSESSMENT:    Pt demonstrating improved lumbopelvic stabilization control in both supine and standing.  R LE continues to struggle with dynamic

## 2020-10-06 ENCOUNTER — HOSPITAL ENCOUNTER (OUTPATIENT)
Dept: PHYSICAL THERAPY | Age: 70
Setting detail: THERAPIES SERIES
Discharge: HOME | End: 2020-10-06
Payer: MEDICARE

## 2020-10-06 PROCEDURE — 97110 THERAPEUTIC EXERCISES: CPT

## 2020-10-06 PROCEDURE — 97112 NEUROMUSCULAR REEDUCATION: CPT

## 2020-10-06 PROCEDURE — 97140 MANUAL THERAPY 1/> REGIONS: CPT

## 2020-10-06 NOTE — FLOWSHEET NOTE
Physical Therapy Daily Treatment Note    Date:  10/6/2020    Patient Name:  Anastacio Runner    :  1950  MRN: [id number]  Restrictions/Precautions:    Medical/Treatment Diagnosis Information:  · Diagnosis: lumbar spondylosis  Insurance/Certification information:  PT Insurance Information: Medicare and [facility] - no precert, medical necessity  Physician Information:  Referring Practitioner: STACIE Segura  Plan of care signed (Y/N):  N  Visit# / total visits:       G-Code (if applicable):              Medicare Cap (if applicable):  4471 = total amount used, updated 10/6/2020    Time in:   8:30     Timed Treatment: 45 Total Treatment Time:  45  Time out: 9:15  ________________________________________________________________________________________    Pain Level:   1/10  SUBJECTIVE:  Pt reports that her back and knee have both been feeling great!      OBJECTIVE:     Exercise/Equipment Resistance/Repetitions Other comments          Hand heel rock  HEP   Cat/camel  HEP   LTR  HEP          TA set with BP   - BKFO   - march Until control demo'd      hooklying lumbar stab 2x1 min    3-way ball compression 10x10\" each direction    Prone knee flexion 15x    Prone triple extension 10x5\" B    Prone hip IR/ER neuro re-ed x2 mins B    Supine hip IR/ER neuro re-ed x2 mins B    SLS with LE on ball 2x30\" B    Standing multifidus 15x B  With walk out 10x B    multihip machine ABD 15# x15 reps B  EXT 40# x15 reps B    Post sling with step up 15x B    Lateral sling 15x B    B shld ext   15x B           TG   x6 mins                    Other Therapeutic Activities:  Education regarding POC including demonstration with models of anatomy and physiology in order to maximize benefits of treatment    Manual Treatments:         Gr IV opening lumbar spine B with cavitation on set up  B long leg pull  MFR B lumbar spine/QL  Gr V sustained long axis distraction L LE (performed by Padmini Haines, PT)    Total manual 10

## 2020-10-08 ENCOUNTER — HOSPITAL ENCOUNTER (OUTPATIENT)
Dept: PHYSICAL THERAPY | Age: 70
Setting detail: THERAPIES SERIES
Discharge: HOME | End: 2020-10-08
Payer: MEDICARE

## 2020-10-08 PROCEDURE — 97140 MANUAL THERAPY 1/> REGIONS: CPT

## 2020-10-08 PROCEDURE — 97110 THERAPEUTIC EXERCISES: CPT

## 2020-10-08 PROCEDURE — 97112 NEUROMUSCULAR REEDUCATION: CPT

## 2020-10-08 NOTE — PROGRESS NOTES
Physical Therapy Daily Treatment/Progress Note    Date:  10/8/2020    Patient Name:  Delilah García    :  1950  MRN: [id number]  Restrictions/Precautions:    Medical/Treatment Diagnosis Information:  · Diagnosis: lumbar spondylosis  Insurance/Certification information:  PT Insurance Information: Medicare and [facility] - no precert, medical necessity  Physician Information:  Referring Practitioner: STACIE Painter  Plan of care signed (Y/N):  N  Visit# / total visits:       G-Code (if applicable): Modified Oswestry 36% (42% disability at eval)     Medicare Cap (if applicable):  7190 = total amount used, updated 10/8/2020    Time in:   9:45     Timed Treatment: 45 Total Treatment Time:  45  Time out: 10:30  ________________________________________________________________________________________    Pain Level:   1/10  SUBJECTIVE:  Pt reports that she will occasionally still have a \"pop\" in her lower back on the left with bending over too far.      OBJECTIVE:     Exercise/Equipment Resistance/Repetitions Other comments          Hand heel rock  HEP   Cat/camel  HEP   LTR  HEP          TA set with BP   - BKFO   - march Until control demo'd      hooklying lumbar stab 2x1 min    3-way ball compression 10x10\" each direction    Prone knee flexion 15x    Prone triple extension 10x5\" B    Prone hip IR/ER neuro re-ed x2 mins B    Supine hip IR/ER neuro re-ed x2 mins B    SLS with LE on ball 2x30\" B    Standing multifidus 15x B  With walk out 10x B    multihip machine ABD 15# x15 reps B  EXT 40# x15 reps B    Post sling with step up 15x B    Lateral sling 15x B    B shld ext   15x B           TG   x6 mins                    Other Therapeutic Activities:  Education regarding POC including demonstration with models of anatomy and physiology in order to maximize benefits of treatment    Manual Treatments:         Gr IV opening lumbar spine B with cavitation on set up  B long leg pull  MFR B lumbar spine/QL  Gr V sustained long axis distraction L LE (performed by Cesar Sánchez, PT)    Total manual 10 minutes    Modalities:      Test/Measurements:      Observation/Palpation  Posture: Good  Palpation: no TTP (At eval: increased TTP B lumbar paraspinals/QL, L PSIS)  Observation: Pt stands with increased pes planus R compared to L, B genuvarus (mild), decreased lumbar lordosis. During ambulation, pt demonstrates improved hip extension, decreased counter rotation of lumbar spine/arm swing. Narrow stance B (almost crossing over gait). Body Mechanics: DL squat WFL (At eval: quad dominant and when cued correctly, increased back pain); SLS improved from last evaluation     PROM RLE (degrees)  RLE PROM: WNL  PROM LLE (degrees)  LLE PROM: WNL  Spine  Lumbar: WNL without pain (At eval: pain on R during extension only)  Special Tests: (-) flick test B; negative pelvic asymmetry     Strength RLE  Strength RLE: Exception  Comment: hip IR/ER 4/5, hip ext 4/5  Strength LLE  Strength LLE: Exception  Comment: hip IR/ER 4/5, hip ext 4/5  Additional Measures  Special Tests: (-) TETE B (At eval: (+) TETE on L for R lower back pain/L hip)  Other: DTR 2+ B achilles and patellar tendons, (-) clonus, (-) Babinski  Sensation       ASSESSMENT:  After 8 PT visits, pt is demonstrating improved ROM, strength, and exercise/activity tolerance. She continues to have intermittent pelvic asymmetry which leads to increased pain and decreased muscular endurance. Therefore, recommend continued PT at 1x/week for 6 more weeks to progress toward independence.      Treatment/Activity Tolerance:   [x]Patient tolerated treatment well [] Patient limited by fatique  []Patient limited by pain [] Patient limited by other medical complications  [] Other:     Goals:          Long term goals  Time Frame for Long term goals : 4 weeks  Long term goal 1: Pt will be independent with HEP - met  Long term goal 2: Pt will report 50% improvement in frequency and intensity of back pain - 80%   Long term goal 3: Pt will demonstrate WFL B LE strength - improving, see above  Long term goal 4: Pt will improve Modified Oswestry by 7 points or greater to indicate significant change - not met, improved by 3 points  Long term goal 5: Pt will return to exercise program without limitation from back pain - progressing, has not returned to full walking program (primarily due to foot pain versus back pain)    Plan: [] Continue per plan of care [x] Alter current plan (see comments)   [] Plan of care initiated [] Hold pending MD visit [] Discharge      Plan for Next Session:  Correction of biomechanical dysfunctions as they present on visit. Restore proper motor firing pattern and functional muscle activation as presented on visit. Progress as tolerates. Re-Certification Due Date:         Signature:  Sandee Stevens , PT , DPT [id number]      If you have any questions or concerns, please don't hesitate to call.   Thank you for your referral.    Physician Signature:________________________________Date:__________________  By signing above, therapists plan is approved by physician    Please return by fax to [phone]

## 2020-10-13 ENCOUNTER — HOSPITAL ENCOUNTER (OUTPATIENT)
Dept: PHYSICAL THERAPY | Age: 70
Setting detail: THERAPIES SERIES
Discharge: HOME | End: 2020-10-13
Payer: MEDICARE

## 2020-10-13 PROCEDURE — 97140 MANUAL THERAPY 1/> REGIONS: CPT

## 2020-10-13 PROCEDURE — 97110 THERAPEUTIC EXERCISES: CPT

## 2020-10-13 NOTE — PROGRESS NOTES
Physical Therapy Daily Treatment/Progress Note    Date:  10/13/2020    Patient Name:  Griselda Bobo    :  1950  MRN: [id number]  Restrictions/Precautions:    Medical/Treatment Diagnosis Information:  · Diagnosis: lumbar spondylosis  Insurance/Certification information:  PT Insurance Information: Medicare and [facility] - no precert, medical necessity  Physician Information:  Referring Practitioner: STACIE Lay  Plan of care signed (Y/N):  N  Visit# / total visits:       G-Code (if applicable): Modified Oswestry 36% (42% disability at eval)     Medicare Cap (if applicable):  9322 = total amount used, updated 10/13/2020    Time in:   8:30     Timed Treatment: 45 Total Treatment Time:  45  Time out: 9:15  ________________________________________________________________________________________    Pain Level:   1/10  SUBJECTIVE:  Pt reports that he cold has her knee a little stiffer than typical. Overall, feeling good.      OBJECTIVE:     Exercise/Equipment Resistance/Repetitions Other comments          Hand heel rock  HEP   Cat/camel  HEP   LTR  HEP          TA set with BP   - BKFO   - march Until control demo'd      hooklying lumbar stab 2x1 min    3-way ball compression 10x10\" each direction    Prone knee flexion 15x    Prone triple extension 10x5\" B    Prone hip IR/ER neuro re-ed x2 mins B    Supine hip IR/ER neuro re-ed x2 mins B    SLS with LE on ball 2x30\" B    Standing multifidus 15x B  With walk out 10x B    multihip machine ABD 15# x15 reps B  EXT 40# x15 reps B    Post sling with step up 15x B    Lateral sling 15x B    B shld ext   15x B    Hip ER into wall 10x5\" B    TG   x6 mins                    Other Therapeutic Activities:  Education regarding POC including demonstration with models of anatomy and physiology in order to maximize benefits of treatment    Manual Treatments:         Gr IV opening lumbar spine B with cavitation on set up  B long leg pull  MFR B lumbar spine/QL      Total manual 10 minutes    Modalities:      Test/Measurements:      Observation/Palpation  Posture: Good  Palpation: no TTP (At eval: increased TTP B lumbar paraspinals/QL, L PSIS)  Observation: Pt stands with increased pes planus R compared to L, B genuvarus (mild), decreased lumbar lordosis. During ambulation, pt demonstrates improved hip extension, decreased counter rotation of lumbar spine/arm swing. Narrow stance B (almost crossing over gait). Body Mechanics: DL squat WFL (At eval: quad dominant and when cued correctly, increased back pain); SLS improved from last evaluation     PROM RLE (degrees)  RLE PROM: WNL  PROM LLE (degrees)  LLE PROM: WNL  Spine  Lumbar: WNL without pain (At eval: pain on R during extension only)  Special Tests: (-) flick test B; negative pelvic asymmetry     Strength RLE  Strength RLE: Exception  Comment: hip IR/ER 4/5, hip ext 4/5  Strength LLE  Strength LLE: Exception  Comment: hip IR/ER 4/5, hip ext 4/5  Additional Measures  Special Tests: (-) TETE B (At eval: (+) TETE on L for R lower back pain/L hip)  Other: DTR 2+ B achilles and patellar tendons, (-) clonus, (-) Babinski  Sensation       ASSESSMENT:   Pt responded well to PT, demonstrating improved control of R lumbo/pelvic/hip complex during standing activities. Progress as pt tolerates.      Treatment/Activity Tolerance:   [x]Patient tolerated treatment well [] Patient limited by fatique  []Patient limited by pain [] Patient limited by other medical complications  [] Other:     Goals:          Long term goals  Time Frame for Long term goals : 4 weeks  Long term goal 1: Pt will be independent with HEP - met  Long term goal 2: Pt will report 50% improvement in frequency and intensity of back pain - 80%   Long term goal 3: Pt will demonstrate WFL B LE strength - improving, see above  Long term goal 4: Pt will improve Modified Oswestry by 7 points or greater to indicate significant change - not met, improved by 3 points  Long term goal 5: Pt will return to exercise program without limitation from back pain - progressing, has not returned to full walking program (primarily due to foot pain versus back pain)    Plan: [x] Continue per plan of care [] Alter current plan (see comments)   [] Plan of care initiated [] Hold pending MD visit [] Discharge      Plan for Next Session:  Correction of biomechanical dysfunctions as they present on visit. Restore proper motor firing pattern and functional muscle activation as presented on visit. Progress as tolerates. Re-Certification Due Date:         Signature:  Austen Duran , PT , DPT [id number]      If you have any questions or concerns, please don't hesitate to call.   Thank you for your referral.    Physician Signature:________________________________Date:__________________  By signing above, therapists plan is approved by physician    Please return by fax to [phone]

## 2020-10-15 ENCOUNTER — APPOINTMENT (OUTPATIENT)
Dept: PHYSICAL THERAPY | Age: 70
End: 2020-10-15
Payer: MEDICARE

## 2020-10-20 ENCOUNTER — HOSPITAL ENCOUNTER (OUTPATIENT)
Dept: PHYSICAL THERAPY | Age: 70
Setting detail: THERAPIES SERIES
Discharge: HOME | End: 2020-10-20
Payer: MEDICARE

## 2020-10-20 PROCEDURE — 97112 NEUROMUSCULAR REEDUCATION: CPT

## 2020-10-20 PROCEDURE — 97140 MANUAL THERAPY 1/> REGIONS: CPT

## 2020-10-20 PROCEDURE — 97110 THERAPEUTIC EXERCISES: CPT

## 2020-10-20 NOTE — FLOWSHEET NOTE
Physical Therapy Daily Treatment Note    Date:  10/20/2020    Patient Name:  Faisal Muse    :  1950  MRN: [id number]  Restrictions/Precautions:    Medical/Treatment Diagnosis Information:  · Diagnosis: lumbar spondylosis  Insurance/Certification information:  PT Insurance Information: Medicare and [facility] - no precert, medical necessity  Physician Information:  Referring Practitioner: STACIE Hale  Plan of care signed (Y/N):  N  Visit# / total visits: 10/14      G-Code (if applicable): Modified Oswestry 36% (42% disability at eval)     Medicare Cap (if applicable):  3498 = total amount used, updated 10/20/2020    Time in:   8:30     Timed Treatment: 45 Total Treatment Time:  45  Time out: 9:15  ________________________________________________________________________________________    Pain Level:   1/10  SUBJECTIVE:  Pt reports that she had to sit and take breaks more this weekend.      OBJECTIVE:     Exercise/Equipment Resistance/Repetitions Other comments          Hand heel rock  HEP   Cat/camel  HEP   LTR  HEP          TA set with      hooklying lumbar stab 2x1 min    3-way ball compression 10x10\" each direction    Prone knee flexion     Prone triple extension 10x5\" B    Prone hip IR/ER neuro re-ed x2 mins B    Supine hip IR/ER neuro re-ed     SLS with LE on ball     Standing multifidus 15x B  With walk out 10x B    multihip machine ABD 15# x15 reps B  EXT 40# x15 reps B    Post sling with step up    Lateral sling    B shld ext   15x B    Hip ER into wall     TG   x6 mins                    Other Therapeutic Activities:  Education regarding POC including demonstration with models of anatomy and physiology in order to maximize benefits of treatment    Manual Treatments:         Gr IV opening lumbar spine B with cavitation on set up  B long leg pull  MFR B lumbar spine/QL  L hip hit technique  Gr V sustained long axis distraction L LE (performed by Kia Mcfarlane, PT)    Total manual 15 minutes    Modalities:      Test/Measurements:      Observation/Palpation  Posture: Good  Palpation: no TTP (At eval: increased TTP B lumbar paraspinals/QL, L PSIS)  Observation: Pt stands with increased pes planus R compared to L, B genuvarus (mild), decreased lumbar lordosis. During ambulation, pt demonstrates improved hip extension, decreased counter rotation of lumbar spine/arm swing. Narrow stance B (almost crossing over gait). Body Mechanics: DL squat WFL (At eval: quad dominant and when cued correctly, increased back pain); SLS improved from last evaluation     PROM RLE (degrees)  RLE PROM: WNL  PROM LLE (degrees)  LLE PROM: WNL  Spine  Lumbar: WNL without pain (At eval: pain on R during extension only)  Special Tests: (-) flick test B; negative pelvic asymmetry     Strength RLE  Strength RLE: Exception  Comment: hip IR/ER 4/5, hip ext 4/5  Strength LLE  Strength LLE: Exception  Comment: hip IR/ER 4/5, hip ext 4/5  Additional Measures  Special Tests: (-) TETE B (At eval: (+) TETE on L for R lower back pain/L hip)  Other: DTR 2+ B achilles and patellar tendons, (-) clonus, (-) Babinski  Sensation       ASSESSMENT:   Pt responded well to PT, demonstrating improved control of R lumbo/pelvic/hip complex during standing activities. Progress as pt tolerates.      Treatment/Activity Tolerance:   [x]Patient tolerated treatment well [] Patient limited by fatique  []Patient limited by pain [] Patient limited by other medical complications  [] Other:     Goals:          Long term goals  Time Frame for Long term goals : 4 weeks  Long term goal 1: Pt will be independent with HEP - met  Long term goal 2: Pt will report 50% improvement in frequency and intensity of back pain - 80%   Long term goal 3: Pt will demonstrate WFL B LE strength - improving, see above  Long term goal 4: Pt will improve Modified Oswestry by 7 points or greater to indicate significant change - not met, improved by 3 points  Long term goal 5: Pt will return to exercise program without limitation from back pain - progressing, has not returned to full walking program (primarily due to foot pain versus back pain)    Plan: [x] Continue per plan of care [] Alter current plan (see comments)   [] Plan of care initiated [] Hold pending MD visit [] Discharge      Plan for Next Session:  Correction of biomechanical dysfunctions as they present on visit. Restore proper motor firing pattern and functional muscle activation as presented on visit. Progress as tolerates. Re-Certification Due Date:         Signature:  Valeria Lee , PT , DPT [id number]      If you have any questions or concerns, please don't hesitate to call.   Thank you for your referral.    Physician Signature:________________________________Date:__________________  By signing above, therapists plan is approved by physician    Please return by fax to [phone]

## 2020-10-22 ENCOUNTER — APPOINTMENT (OUTPATIENT)
Dept: PHYSICAL THERAPY | Age: 70
End: 2020-10-22
Payer: MEDICARE

## 2020-10-27 ENCOUNTER — HOSPITAL ENCOUNTER (OUTPATIENT)
Dept: PHYSICAL THERAPY | Age: 70
Setting detail: THERAPIES SERIES
Discharge: HOME | End: 2020-10-27
Payer: MEDICARE

## 2020-10-27 PROCEDURE — 97140 MANUAL THERAPY 1/> REGIONS: CPT

## 2020-10-27 PROCEDURE — 97112 NEUROMUSCULAR REEDUCATION: CPT

## 2020-10-27 PROCEDURE — 97110 THERAPEUTIC EXERCISES: CPT

## 2020-10-27 NOTE — FLOWSHEET NOTE
Test/Measurements:      Observation/Palpation  Posture: Good  Palpation: no TTP (At eval: increased TTP B lumbar paraspinals/QL, L PSIS)  Observation: Pt stands with increased pes planus R compared to L, B genuvarus (mild), decreased lumbar lordosis. During ambulation, pt demonstrates improved hip extension, decreased counter rotation of lumbar spine/arm swing. Narrow stance B (almost crossing over gait). Body Mechanics: DL squat WFL (At eval: quad dominant and when cued correctly, increased back pain); SLS improved from last evaluation     PROM RLE (degrees)  RLE PROM: WNL  PROM LLE (degrees)  LLE PROM: WNL  Spine  Lumbar: WNL without pain (At eval: pain on R during extension only)  Special Tests: (-) flick test B; negative pelvic asymmetry     Strength RLE  Strength RLE: Exception  Comment: hip IR/ER 4/5, hip ext 4/5  Strength LLE  Strength LLE: Exception  Comment: hip IR/ER 4/5, hip ext 4/5  Additional Measures  Special Tests: (-) TETE B (At eval: (+) TETE on L for R lower back pain/L hip)  Other: DTR 2+ B achilles and patellar tendons, (-) clonus, (-) Babinski  Sensation       ASSESSMENT:   Pt responded well to PT, demonstrating improved control of R lumbo/pelvic/hip complex during standing activities. Progress as pt tolerates.      Treatment/Activity Tolerance:   [x]Patient tolerated treatment well [] Patient limited by fatique  []Patient limited by pain [] Patient limited by other medical complications  [] Other:     Goals:          Long term goals  Time Frame for Long term goals : 4 weeks  Long term goal 1: Pt will be independent with HEP - met  Long term goal 2: Pt will report 50% improvement in frequency and intensity of back pain - 80%   Long term goal 3: Pt will demonstrate WFL B LE strength - improving, see above  Long term goal 4: Pt will improve Modified Oswestry by 7 points or greater to indicate significant change - not met, improved by 3 points  Long term goal 5: Pt will return to exercise program without limitation from back pain - progressing, has not returned to full walking program (primarily due to foot pain versus back pain)    Plan: [x] Continue per plan of care [] Alter current plan (see comments)   [] Plan of care initiated [] Hold pending MD visit [] Discharge      Plan for Next Session:  Correction of biomechanical dysfunctions as they present on visit. Restore proper motor firing pattern and functional muscle activation as presented on visit. Progress as tolerates. Re-Certification Due Date:         Signature:  Corie Mendoza PT , DPT [id number]      If you have any questions or concerns, please don't hesitate to call.   Thank you for your referral.    Physician Signature:________________________________Date:__________________  By signing above, therapists plan is approved by physician    Please return by fax to [phone]

## 2021-02-24 ENCOUNTER — TELEPHONE (OUTPATIENT)
Dept: WOMENS IMAGING | Age: 71
End: 2021-02-24

## 2021-02-24 ENCOUNTER — HOSPITAL ENCOUNTER (OUTPATIENT)
Dept: WOMENS IMAGING | Age: 71
Discharge: HOME | End: 2021-02-24
Payer: MEDICARE

## 2021-02-24 DIAGNOSIS — Z12.31 ENCOUNTER FOR SCREENING MAMMOGRAM FOR MALIGNANT NEOPLASM OF BREAST: ICD-10-CM

## 2021-02-24 PROCEDURE — 77063 BREAST TOMOSYNTHESIS BI: CPT

## 2021-02-24 NOTE — TELEPHONE ENCOUNTER
Reviewed screening mammogram results with patient. Patient verbalized understanding. Patient has been scheduled for additional imaging.     Etta Gomez RN

## 2021-03-02 ENCOUNTER — HOSPITAL ENCOUNTER (OUTPATIENT)
Dept: WOMENS IMAGING | Age: 71
Discharge: HOME | End: 2021-03-02
Payer: MEDICARE

## 2021-03-02 DIAGNOSIS — R92.8 ABNORMAL MAMMOGRAM: ICD-10-CM

## 2021-03-02 PROCEDURE — 77065 DX MAMMO INCL CAD UNI: CPT

## 2021-03-02 PROCEDURE — 76642 ULTRASOUND BREAST LIMITED: CPT

## 2021-08-16 ENCOUNTER — HOSPITAL ENCOUNTER (OUTPATIENT)
Dept: MRI IMAGING | Age: 71
Discharge: HOME | End: 2021-08-16
Payer: MEDICARE

## 2021-08-16 DIAGNOSIS — M54.42 LEFT-SIDED LOW BACK PAIN WITH SCIATICA, SCIATICA LATERALITY UNSPECIFIED, UNSPECIFIED CHRONICITY: ICD-10-CM

## 2021-08-16 PROCEDURE — 72148 MRI LUMBAR SPINE W/O DYE: CPT

## 2021-11-15 ENCOUNTER — CLINICAL DOCUMENTATION (OUTPATIENT)
Dept: OTHER | Age: 71
End: 2021-11-15

## 2021-11-19 ENCOUNTER — HOSPITAL ENCOUNTER (OUTPATIENT)
Dept: WOMENS IMAGING | Age: 71
Discharge: HOME | End: 2021-11-19
Payer: MEDICARE

## 2021-11-19 DIAGNOSIS — R92.8 ABNORMAL MAMMOGRAM: ICD-10-CM

## 2021-11-19 PROCEDURE — G0279 TOMOSYNTHESIS, MAMMO: HCPCS

## 2022-01-18 ENCOUNTER — TELEPHONE (OUTPATIENT)
Dept: ORTHOPEDIC SURGERY | Age: 72
End: 2022-01-18

## 2022-01-18 NOTE — TELEPHONE ENCOUNTER
Attempted to contact patient to inform them about the [address] knee joint pain program. Patient can call [phone] to find out more information or to schedule an appointment with a joint pain orthopedic specialist.

## 2022-02-21 ENCOUNTER — HOSPITAL ENCOUNTER (OUTPATIENT)
Dept: WOMENS IMAGING | Age: 72
Discharge: HOME | End: 2022-02-21
Payer: MEDICARE

## 2022-02-21 DIAGNOSIS — R92.8 ABNORMAL MAMMOGRAM: ICD-10-CM

## 2022-02-21 PROCEDURE — G0279 TOMOSYNTHESIS, MAMMO: HCPCS

## 2023-02-24 ENCOUNTER — HOSPITAL ENCOUNTER (OUTPATIENT)
Dept: WOMENS IMAGING | Age: 73
Discharge: HOME | End: 2023-02-24
Payer: MEDICARE

## 2023-02-24 DIAGNOSIS — R92.8 ABNORMAL MAMMOGRAM: ICD-10-CM

## 2023-02-24 PROCEDURE — G0279 TOMOSYNTHESIS, MAMMO: HCPCS

## 2023-05-21 ENCOUNTER — HOSPITAL ENCOUNTER (EMERGENCY)
Age: 73
Discharge: HOME | End: 2023-05-21
Attending: EMERGENCY MEDICINE
Payer: MEDICARE

## 2023-05-21 ENCOUNTER — APPOINTMENT (OUTPATIENT)
Dept: CT IMAGING | Age: 73
End: 2023-05-21
Payer: MEDICARE

## 2023-05-21 VITALS
OXYGEN SATURATION: 95 % | HEART RATE: 62 BPM | SYSTOLIC BLOOD PRESSURE: 153 MMHG | TEMPERATURE: 97.6 F | WEIGHT: 183 LBS | HEIGHT: 69 IN | DIASTOLIC BLOOD PRESSURE: 81 MMHG | BODY MASS INDEX: 27.11 KG/M2 | RESPIRATION RATE: 18 BRPM

## 2023-05-21 DIAGNOSIS — M54.2 NECK PAIN: Primary | ICD-10-CM

## 2023-05-21 PROCEDURE — 6360000002 HC RX W HCPCS: Performed by: EMERGENCY MEDICINE

## 2023-05-21 PROCEDURE — 99284 EMERGENCY DEPT VISIT MOD MDM: CPT

## 2023-05-21 PROCEDURE — 96372 THER/PROPH/DIAG INJ SC/IM: CPT

## 2023-05-21 PROCEDURE — 72125 CT NECK SPINE W/O DYE: CPT

## 2023-05-21 PROCEDURE — 6370000000 HC RX 637 (ALT 250 FOR IP): Performed by: EMERGENCY MEDICINE

## 2023-05-21 RX ORDER — LIDOCAINE 4 G/G
1 PATCH TOPICAL DAILY
Status: DISCONTINUED | OUTPATIENT
Start: 2023-05-21 | End: 2023-05-21 | Stop reason: HOSPADM

## 2023-05-21 RX ORDER — PREDNISONE 20 MG/1
40 TABLET ORAL DAILY
Qty: 10 TABLET | Refills: 0 | Status: SHIPPED | OUTPATIENT
Start: 2023-05-21 | End: 2023-05-26

## 2023-05-21 RX ORDER — LIDOCAINE 50 MG/G
1 PATCH TOPICAL DAILY
Qty: 10 PATCH | Refills: 0 | Status: SHIPPED | OUTPATIENT
Start: 2023-05-21 | End: 2023-05-31

## 2023-05-21 RX ORDER — LIDOCAINE 4 G/G
1 PATCH TOPICAL DAILY
Status: DISCONTINUED | OUTPATIENT
Start: 2023-05-21 | End: 2023-05-21

## 2023-05-21 RX ORDER — METHOCARBAMOL 750 MG/1
750 TABLET, FILM COATED ORAL ONCE
Status: COMPLETED | OUTPATIENT
Start: 2023-05-21 | End: 2023-05-21

## 2023-05-21 RX ORDER — ACETAMINOPHEN 500 MG
1000 TABLET ORAL 3 TIMES DAILY PRN
Qty: 40 TABLET | Refills: 1 | Status: SHIPPED | OUTPATIENT
Start: 2023-05-21

## 2023-05-21 RX ORDER — NAPROXEN 250 MG/1
250 TABLET ORAL 2 TIMES DAILY WITH MEALS
Qty: 24 TABLET | Refills: 0 | Status: SHIPPED | OUTPATIENT
Start: 2023-05-21

## 2023-05-21 RX ORDER — PREDNISONE 20 MG/1
40 TABLET ORAL ONCE
Status: DISCONTINUED | OUTPATIENT
Start: 2023-05-21 | End: 2023-05-21 | Stop reason: HOSPADM

## 2023-05-21 RX ORDER — ACETAMINOPHEN 500 MG
1000 TABLET ORAL ONCE
Status: COMPLETED | OUTPATIENT
Start: 2023-05-21 | End: 2023-05-21

## 2023-05-21 RX ORDER — KETOROLAC TROMETHAMINE 30 MG/ML
15 INJECTION, SOLUTION INTRAMUSCULAR; INTRAVENOUS ONCE
Status: COMPLETED | OUTPATIENT
Start: 2023-05-21 | End: 2023-05-21

## 2023-05-21 RX ADMIN — ACETAMINOPHEN 1000 MG: 500 TABLET ORAL at 01:55

## 2023-05-21 RX ADMIN — KETOROLAC TROMETHAMINE 15 MG: 30 INJECTION, SOLUTION INTRAMUSCULAR at 01:55

## 2023-05-21 RX ADMIN — METHOCARBAMOL 750 MG: 750 TABLET ORAL at 01:55

## 2023-05-21 ASSESSMENT — PAIN SCALES - GENERAL
PAINLEVEL_OUTOF10: 10
PAINLEVEL_OUTOF10: 8
PAINLEVEL_OUTOF10: 7

## 2023-05-21 ASSESSMENT — PAIN DESCRIPTION - LOCATION: LOCATION: NECK

## 2023-05-21 ASSESSMENT — PAIN - FUNCTIONAL ASSESSMENT: PAIN_FUNCTIONAL_ASSESSMENT: 0-10

## 2024-02-26 ENCOUNTER — HOSPITAL ENCOUNTER (OUTPATIENT)
Dept: WOMENS IMAGING | Age: 74
Discharge: HOME | End: 2024-02-26
Payer: MEDICARE

## 2024-02-26 DIAGNOSIS — Z12.31 SCREENING MAMMOGRAM, ENCOUNTER FOR: ICD-10-CM

## 2024-02-26 PROCEDURE — 77063 BREAST TOMOSYNTHESIS BI: CPT

## 2025-02-28 ENCOUNTER — HOSPITAL ENCOUNTER (OUTPATIENT)
Dept: WOMENS IMAGING | Age: 75
Discharge: HOME | End: 2025-02-28
Payer: MEDICARE

## 2025-02-28 DIAGNOSIS — Z12.31 ENCOUNTER FOR SCREENING MAMMOGRAM FOR MALIGNANT NEOPLASM OF BREAST: ICD-10-CM

## 2025-02-28 PROCEDURE — 77063 BREAST TOMOSYNTHESIS BI: CPT
